# Patient Record
Sex: FEMALE
[De-identification: names, ages, dates, MRNs, and addresses within clinical notes are randomized per-mention and may not be internally consistent; named-entity substitution may affect disease eponyms.]

---

## 2024-08-14 ENCOUNTER — RX ONLY (RX ONLY)
Age: 69
End: 2024-08-14

## 2024-08-14 ENCOUNTER — DOCTOR'S OFFICE (OUTPATIENT)
Facility: LOCATION | Age: 69
Setting detail: OPHTHALMOLOGY
End: 2024-08-14
Payer: MEDICARE

## 2024-08-14 DIAGNOSIS — H40.1132: ICD-10-CM

## 2024-08-14 PROCEDURE — 99213 OFFICE O/P EST LOW 20 MIN: CPT | Performed by: OPHTHALMOLOGY

## 2024-08-28 ENCOUNTER — DOCTOR'S OFFICE (OUTPATIENT)
Facility: LOCATION | Age: 69
Setting detail: OPHTHALMOLOGY
End: 2024-08-28
Payer: MEDICARE

## 2024-08-28 DIAGNOSIS — H40.1132: ICD-10-CM

## 2024-08-28 DIAGNOSIS — H35.353: ICD-10-CM

## 2024-08-28 DIAGNOSIS — Z94.7: ICD-10-CM

## 2024-08-28 PROCEDURE — 92134 CPTRZ OPH DX IMG PST SGM RTA: CPT | Performed by: OPHTHALMOLOGY

## 2024-08-28 PROCEDURE — 99213 OFFICE O/P EST LOW 20 MIN: CPT | Performed by: OPHTHALMOLOGY

## 2024-09-11 ENCOUNTER — DOCTOR'S OFFICE (OUTPATIENT)
Dept: URBAN - NONMETROPOLITAN AREA CLINIC 1 | Facility: CLINIC | Age: 69
Setting detail: OPHTHALMOLOGY
End: 2024-09-11
Payer: MEDICARE

## 2024-09-11 DIAGNOSIS — H40.1132: ICD-10-CM

## 2024-09-11 DIAGNOSIS — H35.353: ICD-10-CM

## 2024-09-11 PROCEDURE — 92134 CPTRZ OPH DX IMG PST SGM RTA: CPT | Performed by: OPHTHALMOLOGY

## 2024-09-11 PROCEDURE — 99213 OFFICE O/P EST LOW 20 MIN: CPT | Performed by: OPHTHALMOLOGY

## 2024-09-11 ASSESSMENT — VISUAL ACUITY
OS_BCVA: 20/50-
OD_BCVA: 20/400

## 2024-09-11 ASSESSMENT — TONOMETRY
OD_IOP_MMHG: 19
OS_IOP_MMHG: 19

## 2024-09-11 ASSESSMENT — CORNEAL EDEMA - FOLDS/STRIAE: OS_FOLDSSTRIAE: 2+

## 2024-09-25 ENCOUNTER — DOCTOR'S OFFICE (OUTPATIENT)
Facility: LOCATION | Age: 69
Setting detail: OPHTHALMOLOGY
End: 2024-09-25
Payer: MEDICARE

## 2024-09-25 ENCOUNTER — RX ONLY (RX ONLY)
Age: 69
End: 2024-09-25

## 2024-09-25 DIAGNOSIS — H35.353: ICD-10-CM

## 2024-09-25 PROBLEM — Z96.1 PRESENCE OF INTRAOCULAR LENS ; BOTH EYES: Status: ACTIVE | Noted: 2024-09-25

## 2024-09-25 PROCEDURE — 99213 OFFICE O/P EST LOW 20 MIN: CPT | Performed by: OPHTHALMOLOGY

## 2024-09-25 ASSESSMENT — CORNEAL EDEMA - FOLDS/STRIAE: OS_FOLDSSTRIAE: 2+

## 2024-09-25 ASSESSMENT — VISUAL ACUITY
OS_BCVA: 20/70-2
OD_BCVA: 20/100

## 2024-10-09 ENCOUNTER — RX ONLY (RX ONLY)
Age: 69
End: 2024-10-09

## 2024-10-09 ENCOUNTER — DOCTOR'S OFFICE (OUTPATIENT)
Facility: LOCATION | Age: 69
Setting detail: OPHTHALMOLOGY
End: 2024-10-09
Payer: MEDICARE

## 2024-10-09 DIAGNOSIS — H35.353: ICD-10-CM

## 2024-10-09 PROCEDURE — 99213 OFFICE O/P EST LOW 20 MIN: CPT | Performed by: OPHTHALMOLOGY

## 2024-10-09 ASSESSMENT — CONFRONTATIONAL VISUAL FIELD TEST (CVF)
OS_FINDINGS: FULL
OD_FINDINGS: FULL

## 2024-10-09 ASSESSMENT — VISUAL ACUITY
OD_BCVA: 20/70-
OS_BCVA: 20/40-

## 2024-10-23 ENCOUNTER — DOCTOR'S OFFICE (OUTPATIENT)
Facility: LOCATION | Age: 69
Setting detail: OPHTHALMOLOGY
End: 2024-10-23
Payer: MEDICARE

## 2024-10-23 DIAGNOSIS — H35.353: ICD-10-CM

## 2024-10-23 DIAGNOSIS — H40.1132: ICD-10-CM

## 2024-10-23 PROCEDURE — 92134 CPTRZ OPH DX IMG PST SGM RTA: CPT | Performed by: OPHTHALMOLOGY

## 2024-10-23 PROCEDURE — 92014 COMPRE OPH EXAM EST PT 1/>: CPT | Performed by: OPHTHALMOLOGY

## 2024-10-23 ASSESSMENT — VISUAL ACUITY
OD_BCVA: 20/70-
OS_BCVA: 20/40-

## 2024-11-06 ENCOUNTER — DOCTOR'S OFFICE (OUTPATIENT)
Facility: LOCATION | Age: 69
Setting detail: OPHTHALMOLOGY
End: 2024-11-06
Payer: MEDICARE

## 2024-11-06 ENCOUNTER — RX ONLY (RX ONLY)
Age: 69
End: 2024-11-06

## 2024-11-06 DIAGNOSIS — H40.041: ICD-10-CM

## 2024-11-06 DIAGNOSIS — H40.1132: ICD-10-CM

## 2024-11-06 PROCEDURE — 99213 OFFICE O/P EST LOW 20 MIN: CPT | Performed by: OPHTHALMOLOGY

## 2024-11-06 ASSESSMENT — VISUAL ACUITY
OD_BCVA: CF 1FT
OS_BCVA: 20/40

## 2024-11-06 ASSESSMENT — CONFRONTATIONAL VISUAL FIELD TEST (CVF)
OD_FINDINGS: FULL
OS_FINDINGS: FULL

## 2024-11-20 ENCOUNTER — DOCTOR'S OFFICE (OUTPATIENT)
Facility: LOCATION | Age: 69
Setting detail: OPHTHALMOLOGY
End: 2024-11-20
Payer: MEDICARE

## 2024-11-20 DIAGNOSIS — H40.1132: ICD-10-CM

## 2024-11-20 DIAGNOSIS — H18.12: ICD-10-CM

## 2024-11-20 DIAGNOSIS — H40.041: ICD-10-CM

## 2024-11-20 DIAGNOSIS — H35.353: ICD-10-CM

## 2024-11-20 PROCEDURE — 99213 OFFICE O/P EST LOW 20 MIN: CPT | Performed by: OPHTHALMOLOGY

## 2024-11-20 ASSESSMENT — VISUAL ACUITY
OD_BCVA: HM
OS_BCVA: 20/40

## 2024-11-28 ENCOUNTER — APPOINTMENT (EMERGENCY)
Dept: CT IMAGING | Facility: HOSPITAL | Age: 69
DRG: 291 | End: 2024-11-28
Payer: MEDICARE

## 2024-11-28 ENCOUNTER — HOSPITAL ENCOUNTER (INPATIENT)
Facility: HOSPITAL | Age: 69
LOS: 4 days | Discharge: HOME | DRG: 291 | End: 2024-12-03
Attending: EMERGENCY MEDICINE | Admitting: INTERNAL MEDICINE
Payer: MEDICARE

## 2024-11-28 ENCOUNTER — APPOINTMENT (EMERGENCY)
Dept: RADIOLOGY | Facility: HOSPITAL | Age: 69
DRG: 291 | End: 2024-11-28
Payer: MEDICARE

## 2024-11-28 DIAGNOSIS — J90 PLEURAL EFFUSION, RIGHT: ICD-10-CM

## 2024-11-28 DIAGNOSIS — R06.03 RESPIRATORY DISTRESS: ICD-10-CM

## 2024-11-28 DIAGNOSIS — I50.33 ACUTE ON CHRONIC DIASTOLIC HEART FAILURE (HCC): ICD-10-CM

## 2024-11-28 DIAGNOSIS — I50.9 CHF, ACUTE (HCC): Primary | ICD-10-CM

## 2024-11-28 DIAGNOSIS — J44.1 COPD WITH ACUTE EXACERBATION (HCC): ICD-10-CM

## 2024-11-28 LAB
ALBUMIN SERPL BCG-MCNC: 4.1 G/DL (ref 3.5–5)
ALP SERPL-CCNC: 97 U/L (ref 34–104)
ALT SERPL W P-5'-P-CCNC: 48 U/L (ref 7–52)
ANION GAP SERPL CALCULATED.3IONS-SCNC: 10 MMOL/L (ref 4–13)
APTT PPP: 33 SECONDS (ref 23–34)
AST SERPL W P-5'-P-CCNC: 59 U/L (ref 13–39)
BASE EXCESS BLDA CALC-SCNC: -2 MMOL/L (ref -2–3)
BASOPHILS # BLD AUTO: 0.05 THOUSANDS/ΜL (ref 0–0.1)
BASOPHILS NFR BLD AUTO: 0 % (ref 0–1)
BILIRUB SERPL-MCNC: 0.73 MG/DL (ref 0.2–1)
BNP SERPL-MCNC: 2251 PG/ML (ref 0–100)
BUN SERPL-MCNC: 17 MG/DL (ref 5–25)
CA-I BLD-SCNC: 1.14 MMOL/L (ref 1.12–1.32)
CALCIUM SERPL-MCNC: 9.3 MG/DL (ref 8.4–10.2)
CARDIAC TROPONIN I PNL SERPL HS: 57 NG/L (ref ?–50)
CHLORIDE SERPL-SCNC: 109 MMOL/L (ref 96–108)
CO2 SERPL-SCNC: 24 MMOL/L (ref 21–32)
CREAT SERPL-MCNC: 1.07 MG/DL (ref 0.6–1.3)
D DIMER PPP FEU-MCNC: 0.99 UG/ML FEU
EOSINOPHIL # BLD AUTO: 0.09 THOUSAND/ΜL (ref 0–0.61)
EOSINOPHIL NFR BLD AUTO: 1 % (ref 0–6)
ERYTHROCYTE [DISTWIDTH] IN BLOOD BY AUTOMATED COUNT: 16.2 % (ref 11.6–15.1)
GFR SERPL CREATININE-BSD FRML MDRD: 53 ML/MIN/1.73SQ M
GLUCOSE SERPL-MCNC: 101 MG/DL (ref 65–140)
GLUCOSE SERPL-MCNC: 99 MG/DL (ref 65–140)
HCO3 BLDA-SCNC: 23.3 MMOL/L (ref 24–30)
HCT VFR BLD AUTO: 39.8 % (ref 34.8–46.1)
HCT VFR BLD CALC: 40 % (ref 34.8–46.1)
HGB BLD-MCNC: 11.9 G/DL (ref 11.5–15.4)
HGB BLDA-MCNC: 13.6 G/DL (ref 11.5–15.4)
IMM GRANULOCYTES # BLD AUTO: 0.06 THOUSAND/UL (ref 0–0.2)
IMM GRANULOCYTES NFR BLD AUTO: 1 % (ref 0–2)
INR PPP: 1.32 (ref 0.85–1.19)
LYMPHOCYTES # BLD AUTO: 1.24 THOUSANDS/ΜL (ref 0.6–4.47)
LYMPHOCYTES NFR BLD AUTO: 11 % (ref 14–44)
MAGNESIUM SERPL-MCNC: 2 MG/DL (ref 1.9–2.7)
MCH RBC QN AUTO: 25.6 PG (ref 26.8–34.3)
MCHC RBC AUTO-ENTMCNC: 29.9 G/DL (ref 31.4–37.4)
MCV RBC AUTO: 86 FL (ref 82–98)
MONOCYTES # BLD AUTO: 1.04 THOUSAND/ΜL (ref 0.17–1.22)
MONOCYTES NFR BLD AUTO: 9 % (ref 4–12)
NEUTROPHILS # BLD AUTO: 8.68 THOUSANDS/ΜL (ref 1.85–7.62)
NEUTS SEG NFR BLD AUTO: 78 % (ref 43–75)
NRBC BLD AUTO-RTO: 0 /100 WBCS
PCO2 BLD: 24 MMOL/L (ref 21–32)
PCO2 BLD: 40.8 MM HG (ref 42–50)
PH BLD: 7.36 [PH] (ref 7.3–7.4)
PLATELET # BLD AUTO: 233 THOUSANDS/UL (ref 149–390)
PMV BLD AUTO: 12.7 FL (ref 8.9–12.7)
PO2 BLD: 36 MM HG (ref 35–45)
POTASSIUM BLD-SCNC: 3.3 MMOL/L (ref 3.5–5.3)
POTASSIUM SERPL-SCNC: 3.7 MMOL/L (ref 3.5–5.3)
PROT SERPL-MCNC: 6.9 G/DL (ref 6.4–8.4)
PROTHROMBIN TIME: 16.9 SECONDS (ref 12.3–15)
RBC # BLD AUTO: 4.65 MILLION/UL (ref 3.81–5.12)
SAO2 % BLD FROM PO2: 66 % (ref 60–85)
SODIUM BLD-SCNC: 144 MMOL/L (ref 136–145)
SODIUM SERPL-SCNC: 143 MMOL/L (ref 135–147)
SPECIMEN SOURCE: ABNORMAL
WBC # BLD AUTO: 11.16 THOUSAND/UL (ref 4.31–10.16)

## 2024-11-28 PROCEDURE — 93005 ELECTROCARDIOGRAM TRACING: CPT

## 2024-11-28 PROCEDURE — 94760 N-INVAS EAR/PLS OXIMETRY 1: CPT

## 2024-11-28 PROCEDURE — 85730 THROMBOPLASTIN TIME PARTIAL: CPT | Performed by: EMERGENCY MEDICINE

## 2024-11-28 PROCEDURE — 83880 ASSAY OF NATRIURETIC PEPTIDE: CPT | Performed by: EMERGENCY MEDICINE

## 2024-11-28 PROCEDURE — 94644 CONT INHLJ TX 1ST HOUR: CPT

## 2024-11-28 PROCEDURE — 96374 THER/PROPH/DIAG INJ IV PUSH: CPT

## 2024-11-28 PROCEDURE — 84295 ASSAY OF SERUM SODIUM: CPT

## 2024-11-28 PROCEDURE — 99291 CRITICAL CARE FIRST HOUR: CPT | Performed by: EMERGENCY MEDICINE

## 2024-11-28 PROCEDURE — 84484 ASSAY OF TROPONIN QUANT: CPT | Performed by: EMERGENCY MEDICINE

## 2024-11-28 PROCEDURE — 84145 PROCALCITONIN (PCT): CPT | Performed by: EMERGENCY MEDICINE

## 2024-11-28 PROCEDURE — 85379 FIBRIN DEGRADATION QUANT: CPT | Performed by: EMERGENCY MEDICINE

## 2024-11-28 PROCEDURE — 71045 X-RAY EXAM CHEST 1 VIEW: CPT

## 2024-11-28 PROCEDURE — 85014 HEMATOCRIT: CPT

## 2024-11-28 PROCEDURE — 83735 ASSAY OF MAGNESIUM: CPT | Performed by: EMERGENCY MEDICINE

## 2024-11-28 PROCEDURE — 85610 PROTHROMBIN TIME: CPT | Performed by: EMERGENCY MEDICINE

## 2024-11-28 PROCEDURE — 99285 EMERGENCY DEPT VISIT HI MDM: CPT

## 2024-11-28 PROCEDURE — 82947 ASSAY GLUCOSE BLOOD QUANT: CPT

## 2024-11-28 PROCEDURE — 85025 COMPLETE CBC W/AUTO DIFF WBC: CPT | Performed by: EMERGENCY MEDICINE

## 2024-11-28 PROCEDURE — 80053 COMPREHEN METABOLIC PANEL: CPT | Performed by: EMERGENCY MEDICINE

## 2024-11-28 PROCEDURE — 36415 COLL VENOUS BLD VENIPUNCTURE: CPT | Performed by: EMERGENCY MEDICINE

## 2024-11-28 PROCEDURE — 82330 ASSAY OF CALCIUM: CPT

## 2024-11-28 PROCEDURE — 94640 AIRWAY INHALATION TREATMENT: CPT

## 2024-11-28 PROCEDURE — 82803 BLOOD GASES ANY COMBINATION: CPT

## 2024-11-28 PROCEDURE — 84132 ASSAY OF SERUM POTASSIUM: CPT

## 2024-11-28 RX ORDER — SODIUM CHLORIDE FOR INHALATION 0.9 %
3 VIAL, NEBULIZER (ML) INHALATION ONCE
Status: COMPLETED | OUTPATIENT
Start: 2024-11-28 | End: 2024-11-28

## 2024-11-28 RX ORDER — IPRATROPIUM BROMIDE AND ALBUTEROL SULFATE 2.5; .5 MG/3ML; MG/3ML
3 SOLUTION RESPIRATORY (INHALATION) ONCE
Status: COMPLETED | OUTPATIENT
Start: 2024-11-28 | End: 2024-11-28

## 2024-11-28 RX ORDER — FUROSEMIDE 10 MG/ML
20 INJECTION INTRAMUSCULAR; INTRAVENOUS ONCE
Status: COMPLETED | OUTPATIENT
Start: 2024-11-28 | End: 2024-11-28

## 2024-11-28 RX ORDER — ASPIRIN 81 MG/1
162 TABLET, CHEWABLE ORAL ONCE
Status: COMPLETED | OUTPATIENT
Start: 2024-11-28 | End: 2024-11-28

## 2024-11-28 RX ORDER — NITROGLYCERIN 0.4 MG/1
0.4 TABLET SUBLINGUAL
Status: COMPLETED | OUTPATIENT
Start: 2024-11-28 | End: 2024-11-28

## 2024-11-28 RX ORDER — ALBUTEROL SULFATE 5 MG/ML
10 SOLUTION RESPIRATORY (INHALATION) ONCE
Status: COMPLETED | OUTPATIENT
Start: 2024-11-28 | End: 2024-11-28

## 2024-11-28 RX ADMIN — IPRATROPIUM BROMIDE AND ALBUTEROL SULFATE 3 ML: 2.5; .5 SOLUTION RESPIRATORY (INHALATION) at 21:59

## 2024-11-28 RX ADMIN — ASPIRIN 81 MG CHEWABLE TABLET 162 MG: 81 TABLET CHEWABLE at 23:00

## 2024-11-28 RX ADMIN — ISODIUM CHLORIDE 3 ML: 0.03 SOLUTION RESPIRATORY (INHALATION) at 22:11

## 2024-11-28 RX ADMIN — NITROGLYCERIN 0.4 MG: 0.4 TABLET SUBLINGUAL at 23:49

## 2024-11-28 RX ADMIN — ALBUTEROL SULFATE 10 MG: 2.5 SOLUTION RESPIRATORY (INHALATION) at 22:10

## 2024-11-28 RX ADMIN — IPRATROPIUM BROMIDE 0.5 MG: 0.5 SOLUTION RESPIRATORY (INHALATION) at 22:11

## 2024-11-28 RX ADMIN — FUROSEMIDE 20 MG: 10 INJECTION, SOLUTION INTRAMUSCULAR; INTRAVENOUS at 23:50

## 2024-11-28 RX ADMIN — NITROGLYCERIN 0.4 MG: 0.4 TABLET SUBLINGUAL at 21:59

## 2024-11-28 RX ADMIN — NITROGLYCERIN 0.4 MG: 0.4 TABLET SUBLINGUAL at 23:01

## 2024-11-28 NOTE — Clinical Note
Case was discussed with RACHEL Dominguez and the patient's admission status was agreed to be Admission Status: inpatient status to the service of Dr. Jaime

## 2024-11-29 ENCOUNTER — APPOINTMENT (INPATIENT)
Dept: NON INVASIVE DIAGNOSTICS | Facility: HOSPITAL | Age: 69
DRG: 291 | End: 2024-11-29
Payer: MEDICARE

## 2024-11-29 ENCOUNTER — APPOINTMENT (EMERGENCY)
Dept: CT IMAGING | Facility: HOSPITAL | Age: 69
DRG: 291 | End: 2024-11-29
Payer: MEDICARE

## 2024-11-29 PROBLEM — Z72.0 TOBACCO ABUSE: Status: ACTIVE | Noted: 2024-11-29

## 2024-11-29 PROBLEM — I50.32 CHRONIC DIASTOLIC HEART FAILURE (HCC): Status: ACTIVE | Noted: 2024-11-29

## 2024-11-29 PROBLEM — I26.99 PULMONARY EMBOLISM (HCC): Status: ACTIVE | Noted: 2024-11-29

## 2024-11-29 PROBLEM — I50.33 ACUTE ON CHRONIC DIASTOLIC HEART FAILURE (HCC): Status: ACTIVE | Noted: 2024-11-29

## 2024-11-29 PROBLEM — I16.1 HYPERTENSIVE EMERGENCY: Status: RESOLVED | Noted: 2024-11-29 | Resolved: 2024-11-29

## 2024-11-29 PROBLEM — Z86.69 H/O THORACIC OUTLET SYNDROME: Status: ACTIVE | Noted: 2024-11-29

## 2024-11-29 PROBLEM — R79.89 ELEVATED TROPONIN: Status: ACTIVE | Noted: 2024-11-29

## 2024-11-29 PROBLEM — G47.33 OSA (OBSTRUCTIVE SLEEP APNEA): Status: ACTIVE | Noted: 2024-11-29

## 2024-11-29 PROBLEM — J43.9 COPD WITH EMPHYSEMA (HCC): Status: ACTIVE | Noted: 2024-11-29

## 2024-11-29 PROBLEM — I25.10 CORONARY ARTERY DISEASE: Status: ACTIVE | Noted: 2024-11-29

## 2024-11-29 PROBLEM — I48.0 PAROXYSMAL ATRIAL FIBRILLATION (HCC): Status: ACTIVE | Noted: 2024-11-29

## 2024-11-29 PROBLEM — I10 BENIGN ESSENTIAL HTN: Status: ACTIVE | Noted: 2024-11-29

## 2024-11-29 PROBLEM — I16.1 HYPERTENSIVE EMERGENCY: Status: ACTIVE | Noted: 2024-11-29

## 2024-11-29 PROBLEM — D72.829 LEUKOCYTOSIS: Status: ACTIVE | Noted: 2024-11-29

## 2024-11-29 LAB
2HR DELTA HS TROPONIN: 107 NG/L
4HR DELTA HS TROPONIN: 94 NG/L
ANION GAP SERPL CALCULATED.3IONS-SCNC: 9 MMOL/L (ref 4–13)
AORTIC ROOT: 3.2 CM
AORTIC VALVE MEAN VELOCITY: 7.2 M/S
APICAL FOUR CHAMBER EJECTION FRACTION: 70 %
ASCENDING AORTA: 2.3 CM
ATRIAL RATE: 68 BPM
AV LVOT MEAN GRADIENT: 1 MMHG
AV LVOT PEAK GRADIENT: 3 MMHG
AV MEAN GRADIENT: 2 MMHG
AV PEAK GRADIENT: 6 MMHG
AV VELOCITY RATIO: 0.71
BSA FOR ECHO PROCEDURE: 1.58 M2
BUN SERPL-MCNC: 14 MG/DL (ref 5–25)
CALCIUM SERPL-MCNC: 9.1 MG/DL (ref 8.4–10.2)
CARDIAC TROPONIN I PNL SERPL HS: 151 NG/L (ref ?–50)
CARDIAC TROPONIN I PNL SERPL HS: 164 NG/L (ref ?–50)
CHLORIDE SERPL-SCNC: 105 MMOL/L (ref 96–108)
CO2 SERPL-SCNC: 28 MMOL/L (ref 21–32)
CREAT SERPL-MCNC: 1.09 MG/DL (ref 0.6–1.3)
DOP CALC AO PEAK VEL: 1.17 M/S
DOP CALC AO VTI: 26.46 CM
DOP CALC LVOT PEAK VEL VTI: 19.57 CM
DOP CALC LVOT PEAK VEL: 0.83 M/S
E WAVE DECELERATION TIME: 260 MS
E/A RATIO: 1.51
ERYTHROCYTE [DISTWIDTH] IN BLOOD BY AUTOMATED COUNT: 16.4 % (ref 11.6–15.1)
EST. AVERAGE GLUCOSE BLD GHB EST-MCNC: 131 MG/DL
FRACTIONAL SHORTENING: 30 (ref 28–44)
GFR SERPL CREATININE-BSD FRML MDRD: 51 ML/MIN/1.73SQ M
GLUCOSE SERPL-MCNC: 142 MG/DL (ref 65–140)
HBA1C MFR BLD: 6.2 %
HCT VFR BLD AUTO: 39.1 % (ref 34.8–46.1)
HGB BLD-MCNC: 11.8 G/DL (ref 11.5–15.4)
INTERVENTRICULAR SEPTUM IN DIASTOLE (PARASTERNAL SHORT AXIS VIEW): 1.4 CM
INTERVENTRICULAR SEPTUM: 1.4 CM (ref 0.6–1.1)
LAAS-AP2: 33.2 CM2
LAAS-AP4: 36.2 CM2
LEFT ATRIUM SIZE: 3.5 CM
LEFT ATRIUM VOLUME (MOD BIPLANE): 138 ML
LEFT ATRIUM VOLUME INDEX (MOD BIPLANE): 87.3 ML/M2
LEFT INTERNAL DIMENSION IN SYSTOLE: 2.6 CM (ref 2.1–4)
LEFT VENTRICLE DIASTOLIC VOLUME (MOD BIPLANE): 81 ML
LEFT VENTRICLE DIASTOLIC VOLUME INDEX (MOD BIPLANE): 51.3 ML/M2
LEFT VENTRICLE SYSTOLIC VOLUME (MOD BIPLANE): 29 ML
LEFT VENTRICLE SYSTOLIC VOLUME INDEX (MOD BIPLANE): 18.4 ML/M2
LEFT VENTRICULAR INTERNAL DIMENSION IN DIASTOLE: 3.7 CM (ref 3.5–6)
LEFT VENTRICULAR POSTERIOR WALL IN END DIASTOLE: 1.4 CM
LEFT VENTRICULAR STROKE VOLUME: 35 ML
LV EF: 64 %
LVSV (TEICH): 35 ML
MAGNESIUM SERPL-MCNC: 1.8 MG/DL (ref 1.9–2.7)
MCH RBC QN AUTO: 25.5 PG (ref 26.8–34.3)
MCHC RBC AUTO-ENTMCNC: 30.2 G/DL (ref 31.4–37.4)
MCV RBC AUTO: 85 FL (ref 82–98)
MV E'TISSUE VEL-LAT: 6 CM/S
MV E'TISSUE VEL-SEP: 4 CM/S
MV PEAK A VEL: 0.39 M/S
MV PEAK E VEL: 59 CM/S
MV STENOSIS PRESSURE HALF TIME: 75 MS
MV VALVE AREA P 1/2 METHOD: 2.9
P AXIS: 90 DEGREES
PHOSPHATE SERPL-MCNC: 3.2 MG/DL (ref 2.3–4.1)
PLATELET # BLD AUTO: 229 THOUSANDS/UL (ref 149–390)
PMV BLD AUTO: 12.7 FL (ref 8.9–12.7)
POTASSIUM SERPL-SCNC: 3 MMOL/L (ref 3.5–5.3)
PR INTERVAL: 124 MS
PROCALCITONIN SERPL-MCNC: 0.06 NG/ML
QRS AXIS: 61 DEGREES
QRSD INTERVAL: 116 MS
QT INTERVAL: 434 MS
QTC INTERVAL: 461 MS
RBC # BLD AUTO: 4.62 MILLION/UL (ref 3.81–5.12)
RIGHT ATRIUM AREA SYSTOLE A4C: 19.1 CM2
RIGHT VENTRICLE ID DIMENSION: 3.8 CM
SL CV LEFT ATRIUM LENGTH A2C: 7.1 CM
SL CV LV EF: 55
SL CV PED ECHO LEFT VENTRICLE DIASTOLIC VOLUME (MOD BIPLANE) 2D: 60 ML
SL CV PED ECHO LEFT VENTRICLE SYSTOLIC VOLUME (MOD BIPLANE) 2D: 25 ML
SODIUM SERPL-SCNC: 142 MMOL/L (ref 135–147)
T WAVE AXIS: 42 DEGREES
TRICUSPID ANNULAR PLANE SYSTOLIC EXCURSION: 2.1 CM
VENTRICULAR RATE: 68 BPM
WBC # BLD AUTO: 12.64 THOUSAND/UL (ref 4.31–10.16)

## 2024-11-29 PROCEDURE — 71275 CT ANGIOGRAPHY CHEST: CPT

## 2024-11-29 PROCEDURE — 93306 TTE W/DOPPLER COMPLETE: CPT | Performed by: INTERNAL MEDICINE

## 2024-11-29 PROCEDURE — RECHECK: Performed by: FAMILY MEDICINE

## 2024-11-29 PROCEDURE — 36415 COLL VENOUS BLD VENIPUNCTURE: CPT | Performed by: EMERGENCY MEDICINE

## 2024-11-29 PROCEDURE — 93306 TTE W/DOPPLER COMPLETE: CPT

## 2024-11-29 PROCEDURE — 99223 1ST HOSP IP/OBS HIGH 75: CPT | Performed by: INTERNAL MEDICINE

## 2024-11-29 PROCEDURE — 85027 COMPLETE CBC AUTOMATED: CPT | Performed by: PHYSICIAN ASSISTANT

## 2024-11-29 PROCEDURE — 96374 THER/PROPH/DIAG INJ IV PUSH: CPT

## 2024-11-29 PROCEDURE — 80048 BASIC METABOLIC PNL TOTAL CA: CPT | Performed by: PHYSICIAN ASSISTANT

## 2024-11-29 PROCEDURE — 93010 ELECTROCARDIOGRAM REPORT: CPT | Performed by: INTERNAL MEDICINE

## 2024-11-29 PROCEDURE — 83036 HEMOGLOBIN GLYCOSYLATED A1C: CPT | Performed by: PHYSICIAN ASSISTANT

## 2024-11-29 PROCEDURE — 96375 TX/PRO/DX INJ NEW DRUG ADDON: CPT

## 2024-11-29 PROCEDURE — 84484 ASSAY OF TROPONIN QUANT: CPT | Performed by: EMERGENCY MEDICINE

## 2024-11-29 PROCEDURE — 99223 1ST HOSP IP/OBS HIGH 75: CPT | Performed by: PHYSICIAN ASSISTANT

## 2024-11-29 PROCEDURE — 84100 ASSAY OF PHOSPHORUS: CPT | Performed by: PHYSICIAN ASSISTANT

## 2024-11-29 PROCEDURE — 83735 ASSAY OF MAGNESIUM: CPT | Performed by: PHYSICIAN ASSISTANT

## 2024-11-29 RX ORDER — UMECLIDINIUM 62.5 UG/1
1 AEROSOL, POWDER ORAL DAILY
COMMUNITY

## 2024-11-29 RX ORDER — POTASSIUM CHLORIDE 750 MG/1
10 CAPSULE, EXTENDED RELEASE ORAL DAILY
COMMUNITY

## 2024-11-29 RX ORDER — NEBIVOLOL 5 MG/1
5 TABLET ORAL DAILY
Status: DISCONTINUED | OUTPATIENT
Start: 2024-11-29 | End: 2024-12-03 | Stop reason: HOSPADM

## 2024-11-29 RX ORDER — TIMOLOL MALEATE 5 MG/ML
1 SOLUTION/ DROPS OPHTHALMIC 2 TIMES DAILY
Status: DISCONTINUED | OUTPATIENT
Start: 2024-11-29 | End: 2024-12-03 | Stop reason: HOSPADM

## 2024-11-29 RX ORDER — ASPIRIN 81 MG/1
81 TABLET, CHEWABLE ORAL DAILY
Status: DISCONTINUED | OUTPATIENT
Start: 2024-11-29 | End: 2024-12-03 | Stop reason: HOSPADM

## 2024-11-29 RX ORDER — PANTOPRAZOLE SODIUM 40 MG/1
40 TABLET, DELAYED RELEASE ORAL
Status: DISCONTINUED | OUTPATIENT
Start: 2024-11-29 | End: 2024-12-03 | Stop reason: HOSPADM

## 2024-11-29 RX ORDER — ONDANSETRON 2 MG/ML
4 INJECTION INTRAMUSCULAR; INTRAVENOUS EVERY 6 HOURS PRN
Status: DISCONTINUED | OUTPATIENT
Start: 2024-11-29 | End: 2024-12-03 | Stop reason: HOSPADM

## 2024-11-29 RX ORDER — SPIRONOLACTONE 25 MG/1
25 TABLET ORAL DAILY
Status: DISCONTINUED | OUTPATIENT
Start: 2024-11-29 | End: 2024-12-03 | Stop reason: HOSPADM

## 2024-11-29 RX ORDER — POTASSIUM CHLORIDE 1500 MG/1
40 TABLET, EXTENDED RELEASE ORAL ONCE
Status: COMPLETED | OUTPATIENT
Start: 2024-11-29 | End: 2024-11-29

## 2024-11-29 RX ORDER — OXYCODONE AND ACETAMINOPHEN 5; 325 MG/1; MG/1
1 TABLET ORAL EVERY 4 HOURS PRN
COMMUNITY

## 2024-11-29 RX ORDER — KETOROLAC TROMETHAMINE 5 MG/ML
1 SOLUTION OPHTHALMIC 4 TIMES DAILY
Status: DISCONTINUED | OUTPATIENT
Start: 2024-11-29 | End: 2024-12-03 | Stop reason: HOSPADM

## 2024-11-29 RX ORDER — GABAPENTIN 300 MG/1
600 CAPSULE ORAL DAILY
Status: DISCONTINUED | OUTPATIENT
Start: 2024-11-29 | End: 2024-12-03 | Stop reason: HOSPADM

## 2024-11-29 RX ORDER — LORATADINE 10 MG/1
10 TABLET ORAL DAILY
Status: DISCONTINUED | OUTPATIENT
Start: 2024-11-29 | End: 2024-12-03 | Stop reason: HOSPADM

## 2024-11-29 RX ORDER — LISINOPRIL 20 MG/1
20 TABLET ORAL 2 TIMES DAILY
Status: DISCONTINUED | OUTPATIENT
Start: 2024-11-29 | End: 2024-12-01

## 2024-11-29 RX ORDER — OMEPRAZOLE 40 MG/1
40 CAPSULE, DELAYED RELEASE ORAL DAILY
COMMUNITY

## 2024-11-29 RX ORDER — SENNOSIDES 8.6 MG
1 TABLET ORAL
Status: DISCONTINUED | OUTPATIENT
Start: 2024-11-29 | End: 2024-12-03 | Stop reason: HOSPADM

## 2024-11-29 RX ORDER — BRINZOLAMIDE 10 MG/ML
1 SUSPENSION/ DROPS OPHTHALMIC 3 TIMES DAILY
Status: ON HOLD | COMMUNITY
End: 2024-11-29 | Stop reason: CLARIF

## 2024-11-29 RX ORDER — ACETAMINOPHEN 325 MG/1
650 TABLET ORAL EVERY 4 HOURS PRN
Status: DISCONTINUED | OUTPATIENT
Start: 2024-11-29 | End: 2024-12-03 | Stop reason: HOSPADM

## 2024-11-29 RX ORDER — HYDRALAZINE HYDROCHLORIDE 20 MG/ML
10 INJECTION INTRAMUSCULAR; INTRAVENOUS ONCE
Status: COMPLETED | OUTPATIENT
Start: 2024-11-29 | End: 2024-11-29

## 2024-11-29 RX ORDER — ATORVASTATIN CALCIUM 10 MG/1
10 TABLET, FILM COATED ORAL DAILY
Status: DISCONTINUED | OUTPATIENT
Start: 2024-11-29 | End: 2024-12-03 | Stop reason: HOSPADM

## 2024-11-29 RX ORDER — POTASSIUM CHLORIDE 1500 MG/1
20 TABLET, EXTENDED RELEASE ORAL ONCE
Status: COMPLETED | OUTPATIENT
Start: 2024-11-29 | End: 2024-11-29

## 2024-11-29 RX ORDER — LISINOPRIL 20 MG/1
20 TABLET ORAL 2 TIMES DAILY
Status: ON HOLD | COMMUNITY
End: 2024-12-03

## 2024-11-29 RX ORDER — HYDRALAZINE HYDROCHLORIDE 25 MG/1
25 TABLET, FILM COATED ORAL EVERY 8 HOURS SCHEDULED
Status: DISCONTINUED | OUTPATIENT
Start: 2024-11-29 | End: 2024-12-02

## 2024-11-29 RX ORDER — ASPIRIN 81 MG/1
81 TABLET, CHEWABLE ORAL DAILY
COMMUNITY

## 2024-11-29 RX ORDER — KETOROLAC TROMETHAMINE 5 MG/ML
1 SOLUTION OPHTHALMIC 4 TIMES DAILY
COMMUNITY

## 2024-11-29 RX ORDER — GABAPENTIN 600 MG/1
600 TABLET ORAL DAILY
COMMUNITY

## 2024-11-29 RX ORDER — LISINOPRIL 20 MG/1
20 TABLET ORAL ONCE
Status: COMPLETED | OUTPATIENT
Start: 2024-11-29 | End: 2024-11-29

## 2024-11-29 RX ORDER — MAGNESIUM HYDROXIDE/ALUMINUM HYDROXICE/SIMETHICONE 120; 1200; 1200 MG/30ML; MG/30ML; MG/30ML
30 SUSPENSION ORAL EVERY 6 HOURS PRN
Status: DISCONTINUED | OUTPATIENT
Start: 2024-11-29 | End: 2024-12-03 | Stop reason: HOSPADM

## 2024-11-29 RX ORDER — FUROSEMIDE 20 MG/1
20 TABLET ORAL DAILY
COMMUNITY
End: 2024-12-03

## 2024-11-29 RX ORDER — HYDRALAZINE HYDROCHLORIDE 25 MG/1
25 TABLET, FILM COATED ORAL DAILY
COMMUNITY

## 2024-11-29 RX ORDER — ATORVASTATIN CALCIUM 10 MG/1
10 TABLET, FILM COATED ORAL DAILY
COMMUNITY

## 2024-11-29 RX ORDER — OXYCODONE AND ACETAMINOPHEN 5; 325 MG/1; MG/1
1 TABLET ORAL EVERY 6 HOURS PRN
Refills: 0 | Status: DISCONTINUED | OUTPATIENT
Start: 2024-11-29 | End: 2024-12-03 | Stop reason: HOSPADM

## 2024-11-29 RX ORDER — DAPAGLIFLOZIN 10 MG/1
1 TABLET, FILM COATED ORAL DAILY
COMMUNITY

## 2024-11-29 RX ORDER — FUROSEMIDE 10 MG/ML
40 INJECTION INTRAMUSCULAR; INTRAVENOUS
Status: DISCONTINUED | OUTPATIENT
Start: 2024-11-29 | End: 2024-11-30

## 2024-11-29 RX ORDER — HYDRALAZINE HYDROCHLORIDE 25 MG/1
25 TABLET, FILM COATED ORAL DAILY
Status: DISCONTINUED | OUTPATIENT
Start: 2024-11-29 | End: 2024-11-29

## 2024-11-29 RX ORDER — LORATADINE 10 MG/1
10 TABLET ORAL DAILY
COMMUNITY

## 2024-11-29 RX ORDER — SPIRONOLACTONE 25 MG/1
25 TABLET ORAL DAILY
COMMUNITY

## 2024-11-29 RX ORDER — NICOTINE POLACRILEX 2 MG
GUM BUCCAL
COMMUNITY

## 2024-11-29 RX ORDER — NEBIVOLOL 5 MG/1
5 TABLET ORAL DAILY
COMMUNITY

## 2024-11-29 RX ORDER — HYDRALAZINE HYDROCHLORIDE 20 MG/ML
10 INJECTION INTRAMUSCULAR; INTRAVENOUS EVERY 6 HOURS PRN
Status: DISCONTINUED | OUTPATIENT
Start: 2024-11-29 | End: 2024-12-03 | Stop reason: HOSPADM

## 2024-11-29 RX ORDER — MAGNESIUM SULFATE HEPTAHYDRATE 40 MG/ML
2 INJECTION, SOLUTION INTRAVENOUS ONCE
Status: COMPLETED | OUTPATIENT
Start: 2024-11-29 | End: 2024-11-29

## 2024-11-29 RX ORDER — POTASSIUM CHLORIDE 1500 MG/1
40 TABLET, EXTENDED RELEASE ORAL 2 TIMES DAILY
Status: DISCONTINUED | OUTPATIENT
Start: 2024-11-30 | End: 2024-11-30

## 2024-11-29 RX ADMIN — APIXABAN 5 MG: 5 TABLET, FILM COATED ORAL at 18:54

## 2024-11-29 RX ADMIN — KETOROLAC TROMETHAMINE 1 DROP: 5 SOLUTION OPHTHALMIC at 18:21

## 2024-11-29 RX ADMIN — ASPIRIN 81 MG CHEWABLE TABLET 81 MG: 81 TABLET CHEWABLE at 09:41

## 2024-11-29 RX ADMIN — POTASSIUM CHLORIDE 20 MEQ: 1500 TABLET, EXTENDED RELEASE ORAL at 13:32

## 2024-11-29 RX ADMIN — FUROSEMIDE 40 MG: 10 INJECTION, SOLUTION INTRAVENOUS at 10:24

## 2024-11-29 RX ADMIN — HYDRALAZINE HYDROCHLORIDE 25 MG: 25 TABLET ORAL at 05:51

## 2024-11-29 RX ADMIN — GABAPENTIN 600 MG: 300 CAPSULE ORAL at 09:42

## 2024-11-29 RX ADMIN — LISINOPRIL 20 MG: 20 TABLET ORAL at 03:37

## 2024-11-29 RX ADMIN — NEBIVOLOL 5 MG: 5 TABLET ORAL at 05:51

## 2024-11-29 RX ADMIN — MORPHINE SULFATE 2 MG: 2 INJECTION, SOLUTION INTRAMUSCULAR; INTRAVENOUS at 02:58

## 2024-11-29 RX ADMIN — APIXABAN 5 MG: 5 TABLET, FILM COATED ORAL at 09:41

## 2024-11-29 RX ADMIN — FUROSEMIDE 40 MG: 10 INJECTION, SOLUTION INTRAVENOUS at 16:26

## 2024-11-29 RX ADMIN — MAGNESIUM SULFATE HEPTAHYDRATE 2 G: 2 INJECTION, SOLUTION INTRAVENOUS at 22:18

## 2024-11-29 RX ADMIN — KETOROLAC TROMETHAMINE 1 DROP: 5 SOLUTION OPHTHALMIC at 13:21

## 2024-11-29 RX ADMIN — TIMOLOL MALEATE 1 DROP: 5 SOLUTION OPHTHALMIC at 18:54

## 2024-11-29 RX ADMIN — OXYCODONE HYDROCHLORIDE AND ACETAMINOPHEN 1 TABLET: 5; 325 TABLET ORAL at 20:11

## 2024-11-29 RX ADMIN — LORATADINE 10 MG: 10 TABLET ORAL at 09:41

## 2024-11-29 RX ADMIN — PANTOPRAZOLE SODIUM 40 MG: 40 TABLET, DELAYED RELEASE ORAL at 05:51

## 2024-11-29 RX ADMIN — POTASSIUM CHLORIDE 40 MEQ: 1500 TABLET, EXTENDED RELEASE ORAL at 16:24

## 2024-11-29 RX ADMIN — NETARSUDIL 1 DROP: 0.2 SOLUTION/ DROPS OPHTHALMIC; TOPICAL at 22:19

## 2024-11-29 RX ADMIN — BRINZOLAMIDE/BRIMONIDINE TARTRATE 1 DROP: 10; 2 SUSPENSION/ DROPS OPHTHALMIC at 10:24

## 2024-11-29 RX ADMIN — KETOROLAC TROMETHAMINE 1 DROP: 5 SOLUTION OPHTHALMIC at 22:20

## 2024-11-29 RX ADMIN — BRINZOLAMIDE/BRIMONIDINE TARTRATE 1 DROP: 10; 2 SUSPENSION/ DROPS OPHTHALMIC at 22:19

## 2024-11-29 RX ADMIN — IOHEXOL 85 ML: 350 INJECTION, SOLUTION INTRAVENOUS at 01:07

## 2024-11-29 RX ADMIN — BRINZOLAMIDE/BRIMONIDINE TARTRATE 1 DROP: 10; 2 SUSPENSION/ DROPS OPHTHALMIC at 16:26

## 2024-11-29 RX ADMIN — HYDRALAZINE HYDROCHLORIDE 25 MG: 25 TABLET ORAL at 22:20

## 2024-11-29 RX ADMIN — HYDRALAZINE HYDROCHLORIDE 10 MG: 20 INJECTION INTRAMUSCULAR; INTRAVENOUS at 00:39

## 2024-11-29 RX ADMIN — ATORVASTATIN CALCIUM 10 MG: 10 TABLET, FILM COATED ORAL at 09:41

## 2024-11-29 RX ADMIN — KETOROLAC TROMETHAMINE 1 DROP: 5 SOLUTION OPHTHALMIC at 09:34

## 2024-11-29 RX ADMIN — TIMOLOL MALEATE 1 DROP: 5 SOLUTION OPHTHALMIC at 12:10

## 2024-11-29 RX ADMIN — UMECLIDINIUM 1 PUFF: 62.5 AEROSOL, POWDER ORAL at 09:43

## 2024-11-29 RX ADMIN — SPIRONOLACTONE 25 MG: 25 TABLET ORAL at 09:41

## 2024-11-29 RX ADMIN — HYDRALAZINE HYDROCHLORIDE 25 MG: 25 TABLET ORAL at 16:25

## 2024-11-29 RX ADMIN — LATANOPROSTENE BUNOD 1 DROP: 0.24 SOLUTION/ DROPS OPHTHALMIC at 22:21

## 2024-11-29 RX ADMIN — EMPAGLIFLOZIN 10 MG: 10 TABLET, FILM COATED ORAL at 09:41

## 2024-11-29 NOTE — PLAN OF CARE

## 2024-11-29 NOTE — ASSESSMENT & PLAN NOTE
History of nonobstructive CAD  Of note, she did experience complete occlusion of right SFA during coronary angiogram requiring right femoral endarterectomy in 2014  Continue home aspirin, statin, lisinopril, and beta-blocker

## 2024-11-29 NOTE — CASE MANAGEMENT
Case Management Assessment & Discharge Planning Note    Patient name Joaquina Jorge  Location /-01 MRN [id number]  : 1955 Date 2024       Current Admission Date: 2024  Current Admission Diagnosis:Acute on chronic diastolic heart failure (HCC)   Patient Active Problem List    Diagnosis Date Noted Date Diagnosed    Benign essential HTN 2024     Acute on chronic diastolic heart failure (HCC) 2024     COPD with emphysema (HCC) 2024     Coronary artery disease 2024     H/O thoracic outlet syndrome 2024     LYNDA (obstructive sleep apnea) 2024     Paroxysmal atrial fibrillation (HCC) 2024     Pulmonary embolism (HCC) 2024     Elevated troponin 2024     Leukocytosis 2024     Hypertensive emergency 2024     Tobacco abuse 2024       LOS (days): 0  Geometric Mean LOS (GMLOS) (days):   Days to GMLOS:     OBJECTIVE:    Risk of Unplanned Readmission Score: 16.73         Current admission status: Inpatient       Preferred Pharmacy:   [id number] [place] [address]  Phone: [phone] Fax: [phone]    Primary Care Provider: Johnson Auguste    Primary Insurance: MEDICARE  Secondary Insurance:     ASSESSMENT:  Active Health Care Proxies       Lisandro Greene Health Care Representative - Son   Primary Phone: [phone] (Mobile)                 Advance Directives  Does patient have a Health Care POA?: No  Was patient offered paperwork?: Yes  Does patient currently have a Health Care decision maker?: Yes, please see Health Care Proxy section  Does patient have Advance Directives?: No  Was patient offered paperwork?: Yes  Primary Contact: nacho Lawrence.         Readmission Root Cause  30 Day Readmission: No    Patient Information  Admitted from:: Home  Mental Status: Alert  During Assessment patient was accompanied by: Nacho  Assessment  information provided by:: Patient, Son  Primary Caregiver: Self  Support Systems: Self, Family members, Son  County of Residence: Other (specify in comment box) ([place])  What city do you live in?: [place].  Home entry access options. Select all that apply.: Stairs  Number of steps to enter home.: 3  Do the steps have railings?: Yes  Type of Current Residence: 2 story home  Upon entering residence, is there a bedroom on the main floor (no further steps)?: No  A bedroom is located on the following floor levels of residence (select all that apply):: 2nd Floor  Upon entering residence, is there a bathroom on the main floor (no further steps)?: No  Indicate which floors of current residence have a bathroom (select all the apply):: 2nd Floor  Number of steps to 2nd floor from main floor: One Flight  Living Arrangements: Lives w/ Family members, Lives w/ Spouse/significant other  Is patient a ?: No    Activities of Daily Living Prior to Admission  Functional Status: Independent  Completes ADLs independently?: Yes  Ambulates independently?: Yes  Does patient use assisted devices?: No  Does patient currently own DME?: No  Does patient have a history of Outpatient Therapy (PT/OT)?: No  Does the patient have a history of Short-Term Rehab?: No  Does patient have a history of HHC?: No  Does patient currently have HHC?: No         Patient Information Continued  Income Source: SSI/SSD  Does patient have prescription coverage?: No  Does patient receive dialysis treatments?: No  Does patient have a history of substance abuse?: No  Does patient have a history of Mental Health Diagnosis?: No         Means of Transportation  Means of Transport to Hospitals in [place]:: Family transport          DISCHARGE DETAILS:    Discharge planning discussed with:: Pt and son Lisandro  [place] of Choice: Yes     CM contacted family/caregiver?: Yes  Were Treatment Team discharge recommendations reviewed with patient/caregiver?: Yes  Did  patient/caregiver verbalize understanding of patient care needs?: Yes  Were patient/caregiver advised of the risks associated with not following Treatment Team discharge recommendations?: Yes    Contacts  Patient Contacts: allison Lawrence  Relationship to Patient:: Family  Contact Method: In Person  Reason/Outcome: Discharge Planning, Continuity of Care    Requested Home Health Care         Is the patient interested in HHC at discharge?: No    DME Referral Provided  Referral made for DME?: No    Other Referral/Resources/Interventions Provided:  Interventions: None Indicated         Treatment Team Recommendation: Home  Discharge Destination Plan:: Home                                         Additional Comments: CM met with pt and son Lisandro at bedside to discern discharge needs. Pt reports she lives with spouse and granddaugher in a 2sh, 3STE, bed and bath upstairs. Pt reports being independent with ADLs and walking PTA. Does not use or own DME. Has a car, but family provides pt with transport. Had a CPAP, but returned it due to the mask irritating her eyes. No hx of OPPT or STR. Hx of HHC. No inpatient psych stays or D&A treatment. Does not have a medical POA. Provided information on how to obtain one. Son will provide transport at discharge.

## 2024-11-29 NOTE — ASSESSMENT & PLAN NOTE
Accelerated hypertension  BP on admission 229/109  BP elevated this morning but improving since admission: 171/60  On lisinopril 20 mg PO BID, hydralazine 25 mg PO daily, bystolic 5 mg PO daily and spirolactone 25 mg PO daily   Will increase hydralazine to 25 mg PO TID.   Monitor BP.   Will likely improve with diuretics and increase of hydralazine

## 2024-11-29 NOTE — ASSESSMENT & PLAN NOTE
Wt Readings from Last 3 Encounters:   11/29/24 56.2 kg (123 lb 14.4 oz)     Presenting with dyspnea for few hours PTA to the ED w/ worsening leg swelling over the last few weeks  History of diastolic heart failure-maintained on Lasix 20 Mg daily, spironolactone 25 Mg daily, and Farxiga 10 Mg daily  CT chest consistent with pulmonary congestion and BNP 2251  Given Lasix 20 Mg IV x 1 in the ED with 1 L urine output so far-will change to Lasix 40 Mg IV twice daily  Continue home spironolactone and Farxiga  Obtain updated echo  Cardiology consulted  I/O and daily weights  Sodium and fluid restriction

## 2024-11-29 NOTE — ASSESSMENT & PLAN NOTE
HS 57/164/151  Suspect elevated troponin secondary to acute on chronic heart failure, as well as hypertensive emergency  Check echo  Cardiology consulted due to concurrent acute heart failure

## 2024-11-29 NOTE — ASSESSMENT & PLAN NOTE
Wt Readings from Last 3 Encounters:   11/29/24 56.2 kg (123 lb 14.4 oz)     Presenting with dyspnea for few hours PTA to the ED w/ worsening leg swelling over the last few weeks  History of diastolic heart failure-maintained on Lasix 20 Mg daily, spironolactone 25 Mg daily, and Farxiga 10 Mg daily  CT chest consistent with pulmonary congestion and BNP 2251  Given Lasix 20 Mg IV x 1 in the ED with 1 L urine output so far-will change to Lasix 40 Mg IV twice daily  Continue home spironolactone and Farxiga  Obtain updated echo; now pending  Cardiology consulted  I/O and daily weights  Sodium and fluid restriction

## 2024-11-29 NOTE — NURSING NOTE
"Spoke with patient regarding CHF education. CHF folder with booklet reviewed as patient has some vision impairment . Discussed CHF and interventions that can be done to help prevent hospitalization. Diet with sodium restrictions and fluid restrictions reviewed.  Patient able to verbalize the importance of these measures, however she states she has some difficulty with restricting fluids and salt as she doesn't currently keep track of how much she takes in, and drinks a lot of Pepsi.  Importance of daily weights for monitoring reviewed. Patient has a scale at home, but does not weigh herself daily. Patient states she will try and weigh herself daily. Reviewed the \"zones\" and their meaning and when to call her physicians. Patient states she follows regularly with a cardiologist and pulmonologist in the [place] area, as that is where she resides.   Son was present for some of the education. He is visiting from out of town, but will review the information with family in which patient lives with. Son looking into magnifying apparatus to assist patient with reading.   No additional questions by patient or son  Spoke with nurse Mireles and reviewed education provided to patient.   "

## 2024-11-29 NOTE — H&P
H&P - Hospitalist   Name: Joaquina Jorge 69 y.o. female I MRN: [id number]  Unit/Bed#: -01 I Date of Admission: 11/28/2024   Date of Service: 11/29/2024 I Hospital Day: 0     Assessment & Plan  Acute on chronic diastolic heart failure (HCC)  Wt Readings from Last 3 Encounters:   11/29/24 56.2 kg (123 lb 14.4 oz)     Presenting with dyspnea for few hours PTA to the ED w/ worsening leg swelling over the last few weeks  History of diastolic heart failure-maintained on Lasix 20 Mg daily, spironolactone 25 Mg daily, and Farxiga 10 Mg daily  CT chest consistent with pulmonary congestion and BNP 2251  Given Lasix 20 Mg IV x 1 in the ED with 1 L urine output so far-will change to Lasix 40 Mg IV twice daily  Continue home spironolactone and Farxiga  Obtain updated echo  Cardiology consulted  I/O and daily weights  Sodium and fluid restriction  Elevated troponin  HS 57/164/151  Suspect elevated troponin secondary to acute on chronic heart failure, as well as hypertensive emergency  Check echo  Cardiology consulted due to concurrent acute heart failure  Hypertensive emergency  BP markedly elevated in the ED at 229/109  Given NTG without significant improvement, BP did improve s/p hydralazine  Home regimen: Hydralazine 25 Mg daily, lisinopril 20 Mg twice daily, Bystolic 5 Mg daily, spironolactone 25 Mg daily, and Lasix 20 Mg daily  Hold home Lasix due to IV diuresis but continue home medications otherwise  Hydralazine as needed  Leukocytosis  WBC 11.1 6K on admit  No signs of acute bacterial infection at this time  Monitor off antibiotics  Trend CBC and fever curve  COPD with emphysema (HCC)  Home regimen: Incruse daily  Patient is without active wheezing on admit  Continue home Incruse for now as she has good inspiratory effort on exam  Low threshold to change to nebulizers if inspiratory effort changes at all  Benign essential HTN  Home regimen: Hydralazine 25 Mg daily, lisinopril 20 Mg twice daily, Bystolic 5 Mg  "daily, spironolactone 25 Mg daily, and Lasix 20 Mg daily  Continue home medications except for Lasix at this time  Further A/P above as hypertensive emergency  Paroxysmal atrial fibrillation (HCC)  RC: Nebivolol 5 Mg daily  AC: Eliquis 5 Mg twice daily  Continue home medications  Pulmonary embolism (HCC)  Continue home Eliquis 5 Mg twice daily  History of IVC filter placement in the past  Coronary artery disease  History of nonobstructive CAD  Of note, she did experience complete occlusion of right SFA during coronary angiogram requiring right femoral endarterectomy in 2014  Continue home aspirin, statin, lisinopril, and beta-blocker  LYNDA (obstructive sleep apnea)  Previously wore CPAP but this was stopped secondary to worsening glaucoma  NC HS PRN  Tobacco abuse  Smokes approximately 5 cigarettes daily  NRT while inpatient      VTE Pharmacologic Prophylaxis: VTE Score: 5 High Risk (Score >/= 5) - Pharmacological DVT Prophylaxis Ordered: apixaban (Eliquis). Sequential Compression Devices Ordered.  Code Status: Level 1 - Full Code   Discussion with family: Updated  (son) at bedside.    Anticipated Length of Stay: Patient will be admitted on an inpatient basis with an anticipated length of stay of greater than 2 midnights secondary to acute on chronic diastolic heart failure.    History of Present Illness   Chief Complaint: \" Feeling hot and short of breath\"    Joaquina Jorge is a 69 y.o. female with a PMH of diastolic HF, PAF, right femoral endarterectomy, tobacco abuse, and HTN who presents with her son for evaluation of dyspnea and feeling hot.  Patient reports a few hours prior to arrival to the ED she was at her son's house and Feeling like she was hot and could not breathe.  She states that she stepped outside without any improvement in her symptoms.  She also used her inhaler without any change in symptoms.  She has noticed increased leg swelling over the last few weeks.  Denies chest pain or " tightness.  Denies abdominal pain, nausea, vomiting, or constipation.  No fevers or chills.  No recent URI-like symptoms.  Denies any dyspnea prior to symptom onset earlier today.    Review of Systems   Constitutional:  Negative for chills and fever.   HENT:  Negative for congestion.    Respiratory:  Positive for shortness of breath.    Cardiovascular:  Positive for leg swelling. Negative for chest pain.   Gastrointestinal:  Negative for abdominal pain, constipation, nausea and vomiting.   Genitourinary:  Negative for difficulty urinating and dysuria.   Neurological:  Negative for weakness and numbness.   All other systems reviewed and are negative.      Historical Information   Past Medical History:   Diagnosis Date    Bronchitis     COPD (chronic obstructive pulmonary disease) ([facility])     Hypertension     MI (myocardial infarction) ([facility])      Past Surgical History:   Procedure Laterality Date    EYE SURGERY       Social History     Tobacco Use    Smoking status: Every Day     Types: Cigarettes    Smokeless tobacco: Never   Vaping Use    Vaping status: Never Used   Substance and Sexual Activity    Alcohol use: Not Currently    Drug use: Never    Sexual activity: Not on file     E-Cigarette/Vaping    E-Cigarette Use Never User      E-Cigarette/Vaping Substances    Nicotine No     THC No     CBD No     Flavoring No     Other No     Unknown No      Family history non-contributory  Social History:  Marital Status: Single   Occupation: Retired  Patient Pre-hospital Living Situation: Home, With spouse  Patient Pre-hospital Level of Mobility: walks  Patient Pre-hospital Diet Restrictions: None    Meds/Allergies   I have reviewed home medications with patient personally.  Prior to Admission medications    Medication Sig Start Date End Date Taking? Authorizing Provider   apixaban (ELIQUIS) 5 mg Take 5 mg by mouth 2 (two) times a day   Yes Historical Provider, MD   aspirin 81 mg chewable tablet Chew 81 mg daily   Yes  Historical Provider, MD   atorvastatin (LIPITOR) 10 mg tablet Take 10 mg by mouth daily   Yes Historical Provider, MD   Brinzolamide-Brimonidine 1-0.2 % SUSP Administer 1 drop to both eyes 3 (three) times a day   Yes Historical Provider, MD   dapagliflozin (Farxiga) 10 MG tablet Take 1 tablet by mouth daily   Yes Historical Provider, MD   furosemide (LASIX) 20 mg tablet Take 20 mg by mouth daily   Yes Historical Provider, MD   gabapentin (NEURONTIN) 600 MG tablet Take 600 mg by mouth daily   Yes Historical Provider, MD   hydrALAZINE (APRESOLINE) 25 mg tablet Take 25 mg by mouth daily   Yes Historical Provider, MD   ketorolac (ACULAR) 0.5 % ophthalmic solution Administer 1 drop to both eyes 4 (four) times a day   Yes Historical Provider, MD   lisinopril (ZESTRIL) 20 mg tablet Take 20 mg by mouth 2 (two) times a day   Yes Historical Provider, MD   loratadine (CLARITIN) 10 mg tablet Take 10 mg by mouth daily   Yes Historical Provider, MD   nebivolol (BYSTOLIC) 5 mg tablet Take 5 mg by mouth daily   Yes Historical Provider, MD   Netarsudil Dimesylate 0.02 % SOLN Apply 1 drop to eye daily   Yes Historical Provider, MD   omeprazole (PriLOSEC) 40 MG capsule Take 40 mg by mouth daily   Yes Historical Provider, MD   oxyCODONE-acetaminophen (PERCOCET) 5-325 mg per tablet Take 1 tablet by mouth every 4 (four) hours as needed for moderate pain   Yes Historical Provider, MD   potassium chloride (MICRO-K) 10 MEQ CR capsule Take 10 mEq by mouth daily   Yes Historical Provider, MD   spironolactone (ALDACTONE) 25 mg tablet Take 25 mg by mouth daily   Yes Historical Provider, MD   timolol (BETIMOL) 0.5 % ophthalmic solution Administer 1 drop to both eyes 2 (two) times a day   Yes Historical Provider, MD   umeclidinium (Incruse Ellipta) 62.5 mcg/actuation AEPB inhaler Inhale 1 puff daily   Yes Historical Provider, MD   brinzolamide (AZOPT) 1 % ophthalmic suspension Administer 1 drop to both eyes 3 (three) times a day  11/29/24 Yes  Historical Provider, MD     Allergies   Allergen Reactions    Penicillins Other (See Comments)     Pt cannot remember.    Voltaren [Diclofenac] Hives       Objective :  Temp:  [97.2 °F (36.2 °C)-99.3 °F (37.4 °C)] 99.3 °F (37.4 °C)  HR:  [63-73] 71  BP: (174-229)/() 192/90  Resp:  [20-24] 20  SpO2:  [90 %-100 %] 100 %  O2 Device: None (Room air)    Physical Exam  Vitals and nursing note reviewed.   Constitutional:       General: She is not in acute distress.     Appearance: Normal appearance. She is not ill-appearing.      Comments: Pleasant and conversational.   HENT:      Head: Normocephalic.      Nose: Nose normal.      Mouth/Throat:      Mouth: Mucous membranes are moist.   Eyes:      Conjunctiva/sclera: Conjunctivae normal.   Cardiovascular:      Rate and Rhythm: Normal rate and regular rhythm.      Pulses: Normal pulses.      Heart sounds: No murmur heard.  Pulmonary:      Effort: Pulmonary effort is normal. No respiratory distress.      Breath sounds: Rales (Fine bibasilar rales.) present. No wheezing (No expiratory wheezing).   Abdominal:      General: Abdomen is flat.      Palpations: Abdomen is soft.      Tenderness: There is no abdominal tenderness.   Musculoskeletal:         General: Normal range of motion.      Cervical back: Normal range of motion.      Comments: 2+ pitting edema to bilateral lower extremities.  Nonhealing wound to left foot, no surrounding erythema or purulent drainage.   Skin:     General: Skin is warm and dry.   Neurological:      General: No focal deficit present.      Mental Status: She is alert and oriented to person, place, and time.   Psychiatric:         Mood and Affect: Mood normal.         Thought Content: Thought content normal.          Lines/Drains:            Lab Results: I have reviewed the following results:  Results from last 7 days   Lab Units 11/28/24  2150   WBC Thousand/uL 11.16*   HEMOGLOBIN g/dL 11.9   HEMATOCRIT % 39.8   PLATELETS Thousands/uL 233   SEGS  "PCT % 78*   LYMPHO PCT % 11*   MONO PCT % 9   EOS PCT % 1     Results from last 7 days   Lab Units 11/28/24  2150   SODIUM mmol/L 143   POTASSIUM mmol/L 3.7   CHLORIDE mmol/L 109*   CO2 mmol/L 24   BUN mg/dL 17   CREATININE mg/dL 1.07   ANION GAP mmol/L 10   CALCIUM mg/dL 9.3   ALBUMIN g/dL 4.1   TOTAL BILIRUBIN mg/dL 0.73   ALK PHOS U/L 97   ALT U/L 48   AST U/L 59*   GLUCOSE RANDOM mg/dL 99     Results from last 7 days   Lab Units 11/28/24  2150   INR  1.32*         No results found for: \"HGBA1C\"  Results from last 7 days   Lab Units 11/28/24  2150   PROCALCITONIN ng/ml 0.06       Imaging Results Review: I reviewed radiology reports from this admission including: CT chest.  I personally reviewed chest x-ray.  My personal interpretation is cardiomegaly, emphysematous lungs, and pulmonary congestion.  Other Study Results Review: No additional pertinent studies reviewed.    Administrative Statements       ** Please Note: This note has been constructed using a voice recognition system. **    "

## 2024-11-29 NOTE — ASSESSMENT & PLAN NOTE
HS troponin 57-->164-->151  Patient reports chest pain but is reproducible on exam and non cardiac  Elevated troponin/non MI elevated troponin in the setting of CHF   Echocardiogram pending

## 2024-11-29 NOTE — PROGRESS NOTES
Progress Note - Hospitalist   Name: Joaquina Jorge 69 y.o. female I MRN: [id number]  Unit/Bed#: -01 I Date of Admission: 11/28/2024   Date of Service: 11/29/2024 I Hospital Day: 0    Assessment & Plan  Acute on chronic diastolic heart failure (HCC)  Wt Readings from Last 3 Encounters:   11/29/24 56.2 kg (123 lb 14.4 oz)     Presenting with dyspnea for few hours PTA to the ED w/ worsening leg swelling over the last few weeks  History of diastolic heart failure-maintained on Lasix 20 Mg daily, spironolactone 25 Mg daily, and Farxiga 10 Mg daily  CT chest consistent with pulmonary congestion and BNP 2251  Given Lasix 20 Mg IV x 1 in the ED with 1 L urine output so far-will change to Lasix 40 Mg IV twice daily  Continue home spironolactone and Farxiga  Obtain updated echo; now pending  Cardiology consulted  I/O and daily weights  Sodium and fluid restriction  Elevated troponin  HS 57/164/151  Suspect elevated troponin secondary to acute on chronic heart failure, as well as hypertensive emergency  Check echo  Cardiology consulted due to concurrent acute heart failure  Hypertensive emergency  BP markedly elevated in the ED at 229/109  Given NTG without significant improvement, BP did improve s/p hydralazine  Home regimen: Hydralazine 25 Mg daily, lisinopril 20 Mg twice daily, Bystolic 5 Mg daily, spironolactone 25 Mg daily, and Lasix 20 Mg daily  Hold home Lasix due to IV diuresis but continue home medications otherwise  Hydralazine as needed  Leukocytosis  WBC 11.1 6K on admit  No signs of acute bacterial infection at this time  Monitor off antibiotics  Trend CBC and fever curve  COPD with emphysema (HCC)  Home regimen: Incruse daily  Patient is without active wheezing on admit  Continue home Incruse for now as she has good inspiratory effort on exam  Low threshold to change to nebulizers if inspiratory effort changes at all  Benign essential HTN  Home regimen: Hydralazine 25 Mg daily, lisinopril 20 Mg twice  daily, Bystolic 5 Mg daily, spironolactone 25 Mg daily, and Lasix 20 Mg daily  Continue home medications except for Lasix at this time  Now BP is improved  Paroxysmal atrial fibrillation (HCC)  RC: Nebivolol 5 Mg daily  AC: Eliquis 5 Mg twice daily  Continue home medications  Pulmonary embolism (HCC)  Continue home Eliquis 5 Mg twice daily  History of IVC filter placement in the past  Coronary artery disease  History of nonobstructive CAD  Of note, she did experience complete occlusion of right SFA during coronary angiogram requiring right femoral endarterectomy in 2014  Continue home aspirin, statin, lisinopril, and beta-blocker  LYNDA (obstructive sleep apnea)  Previously wore CPAP but this was stopped secondary to worsening glaucoma  NC HS PRN  Tobacco abuse  Smokes approximately 5 cigarettes daily  NRT while inpatient    VTE Pharmacologic Prophylaxis: VTE Score: 5 High Risk (Score >/= 5) - Pharmacological DVT Prophylaxis Ordered: apixaban (Eliquis). Sequential Compression Devices Ordered.    Mobility:   Basic Mobility Inpatient Raw Score: 24  JH-HLM Goal: 8: Walk 250 feet or more  JH-HLM Achieved: 8: Walk 250 feet ot more  JH-HLM Goal achieved. Continue to encourage appropriate mobility.    Patient Centered Rounds: I performed bedside rounds with nursing staff today.   Discussions with Specialists or Other Care Team Provider: nursing    Education and Discussions with Family / Patient: Patient declined call to .     Current Length of Stay: 0 day(s)  Current Patient Status: Inpatient   Certification Statement: The patient will continue to require additional inpatient hospital stay due to CHF exacerbation  Discharge Plan: Anticipate discharge in 24-48 hrs to home.    Code Status: Level 1 - Full Code    Ciarra Kunz was seen and examined. She is doing better today with her dyspnea.     Objective :  Temp:  [97.2 °F (36.2 °C)-99.3 °F (37.4 °C)] 99.2 °F (37.3 °C)  HR:  [58-73] 59  BP:  (118-229)/() 153/67  Resp:  [20-24] 20  SpO2:  [90 %-100 %] 97 %  O2 Device: None (Room air)    Body mass index is 21.95 kg/m².     Input and Output Summary (last 24 hours):     Intake/Output Summary (Last 24 hours) at 11/29/2024 1615  Last data filed at 11/29/2024 1501  Gross per 24 hour   Intake 750 ml   Output 4550 ml   Net -3800 ml       Physical Exam  Vitals and nursing note reviewed.   Constitutional:       General: She is not in acute distress.     Appearance: She is well-developed.   HENT:      Head: Normocephalic and atraumatic.   Cardiovascular:      Rate and Rhythm: Normal rate and regular rhythm.      Heart sounds: No murmur heard.  Pulmonary:      Effort: Pulmonary effort is normal. No respiratory distress.      Breath sounds: Decreased breath sounds present.   Abdominal:      Palpations: Abdomen is soft.      Tenderness: There is no abdominal tenderness.   Musculoskeletal:         General: No swelling.      Cervical back: Neck supple.      Left lower leg: Edema (trace pitting edema) present.   Skin:     General: Skin is warm and dry.   Neurological:      Mental Status: She is alert.   Psychiatric:         Mood and Affect: Mood normal.           Lines/Drains:        Telemetry:  Telemetry Orders (From admission, onward)               24 Hour Telemetry Monitoring  Continuous x 24 Hours (Telem)        Question:  Reason for 24 Hour Telemetry  Answer:  Decompensated CHF- and any one of the following: continuous diuretic infusion or total diuretic dose >200 mg daily, associated electrolyte derangement (I.e. K < 3.0), ionotropic drip (continuous infusion), hx of ventricular arrhythmia, or new EF < 35%                     Telemetry Reviewed: Normal Sinus Rhythm  Indication for Continued Telemetry Use: Acute CHF on >200 mg lasix/day or equivalent dose or with new reduced EF.                Lab Results: I have reviewed the following results:   Results from last 7 days   Lab Units 11/29/24  0312  11/28/24  2150   WBC Thousand/uL 12.64* 11.16*   HEMOGLOBIN g/dL 11.8 11.9   HEMATOCRIT % 39.1 39.8   PLATELETS Thousands/uL 229 233   SEGS PCT %  --  78*   LYMPHO PCT %  --  11*   MONO PCT %  --  9   EOS PCT %  --  1     Results from last 7 days   Lab Units 11/29/24  0915 11/28/24 2150   SODIUM mmol/L 142 143   POTASSIUM mmol/L 3.0* 3.7   CHLORIDE mmol/L 105 109*   CO2 mmol/L 28 24   BUN mg/dL 14 17   CREATININE mg/dL 1.09 1.07   ANION GAP mmol/L 9 10   CALCIUM mg/dL 9.1 9.3   ALBUMIN g/dL  --  4.1   TOTAL BILIRUBIN mg/dL  --  0.73   ALK PHOS U/L  --  97   ALT U/L  --  48   AST U/L  --  59*   GLUCOSE RANDOM mg/dL 142* 99     Results from last 7 days   Lab Units 11/28/24 2150   INR  1.32*         Results from last 7 days   Lab Units 11/29/24  0542   HEMOGLOBIN A1C % 6.2*     Results from last 7 days   Lab Units 11/28/24 2150   PROCALCITONIN ng/ml 0.06       Recent Cultures (last 7 days):               Last 24 Hours Medication List:     Current Facility-Administered Medications:     acetaminophen (TYLENOL) tablet 650 mg, Q4H PRN    aluminum-magnesium hydroxide-simethicone (MAALOX) oral suspension 30 mL, Q6H PRN    apixaban (ELIQUIS) tablet 5 mg, BID    aspirin chewable tablet 81 mg, Daily    atorvastatin (LIPITOR) tablet 10 mg, Daily    Brinzolamide-Brimonidine 1-0.2 % SUSP 1 drop, TID    Empagliflozin (JARDIANCE) tablet 10 mg, Daily    furosemide (LASIX) injection 40 mg, BID (diuretic)    gabapentin (NEURONTIN) capsule 600 mg, Daily    hydrALAZINE (APRESOLINE) injection 10 mg, Q6H PRN    hydrALAZINE (APRESOLINE) tablet 25 mg, Q8H HOME    ketorolac (ACULAR) 0.5 % ophthalmic solution 1 drop, 4x Daily    lisinopril (ZESTRIL) tablet 20 mg, BID    loratadine (CLARITIN) tablet 10 mg, Daily    nebivolol (BYSTOLIC) tablet 5 mg, Daily    Netarsudil Dimesylate 0.02 % SOLN 1 drop, HS    nicotine (NICODERM CQ) 7 mg/24hr TD 24 hr patch 1 patch, Daily    ondansetron (ZOFRAN) injection 4 mg, Q6H PRN    oxyCODONE-acetaminophen  (PERCOCET) 5-325 mg per tablet 1 tablet, Q6H PRN    pantoprazole (PROTONIX) EC tablet 40 mg, Early Morning    potassium chloride (Klor-Con M20) CR tablet 40 mEq, Once    senna (SENOKOT) tablet 8.6 mg, HS PRN    spironolactone (ALDACTONE) tablet 25 mg, Daily    timolol (TIMOPTIC) 0.5 % ophthalmic solution 1 drop, BID    umeclidinium 62.5 mcg/actuation inhaler AEPB 1 puff, Daily    Administrative Statements   Today, Patient Was Seen By: Lynn Charles MD      **Please Note: This note may have been constructed using a voice recognition system.**

## 2024-11-29 NOTE — ASSESSMENT & PLAN NOTE
Home regimen: Incruse daily  Patient is without active wheezing on admit  Continue home Incruse for now as she has good inspiratory effort on exam  Low threshold to change to nebulizers if inspiratory effort changes at all

## 2024-11-29 NOTE — ED PROVIDER NOTES
Time reflects when diagnosis was documented in both MDM as applicable and the Disposition within this note       Time User Action Codes Description Comment    11/29/2024  3:11 AM Johnson Calderon [R06.03] Respiratory distress     11/29/2024  3:11 AM Johnson Calderon [J44.1] COPD with acute exacerbation (HCC)     11/29/2024  3:11 AM Johnson Calderon [I50.9] CHF, acute (HCC)     11/29/2024  3:52 AM Johnson Calderon Modify [R06.03] Respiratory distress     11/29/2024  3:52 AM Johnson Calderon Modify [I50.9] CHF, acute (HCC)     11/29/2024  3:52 AM Johnson Calderon [J90] Pleural effusion, right           ED Disposition       ED Disposition   Admit    Condition   Stable    Date/Time   Fri Nov 29, 2024  3:37 AM    Comment   Case was discussed with RACHEL Dominguez and the patient's admission status was agreed to be Admission Status: inpatient status to the service of Dr. Charles .               Assessment & Plan       Medical Decision Making  Diff includes chf exacerbation, copd exacerbation, coronary episode, pneumonia, effusion, pe    Amount and/or Complexity of Data Reviewed  Labs: ordered.  Radiology: ordered and independent interpretation performed.    Risk  OTC drugs.  Prescription drug management.  Decision regarding hospitalization.        ED Course as of 11/29/24 0352   Thu Nov 28, 2024   2258 Still wheezing, chest tight 7/10 after 1 SL nitro.  Getting castellanos neb.  Adding small dose of lasix for elevated bnp and ct ordered   2349 Still sob and chest tight 7/10 - giving lasix and 3rd nitro sl.  BP still high as well.   Fri Nov 29, 2024   0021 TERE Choiey placed US IV but not able to use for contrast - patient currently urinating then will eval for another IV   0030 Diuresed but still chest discomfort and HTN, takes hydralazine at home, will give IV dose here   0136 BP improved, still trouble breathing but sats improved 100% on room air, sounds like rales, still diuresing, still has chest tight 5/10.   0257 Patient has fentanyl patch for  thoracic outlet syndrome on right side   0300 Called to expedite getting ct read       Medications   ipratropium-albuterol (DUO-NEB) 0.5-2.5 mg/3 mL inhalation solution 3 mL (3 mL Nebulization Given 11/28/24 2159)   nitroglycerin (NITROSTAT) SL tablet 0.4 mg (0.4 mg Sublingual Given 11/28/24 2349)   albuterol inhalation solution 10 mg (10 mg Nebulization Given 11/28/24 2210)     And   ipratropium (ATROVENT) 0.02 % inhalation solution 0.5 mg (0.5 mg Nebulization Given 11/28/24 2211)     And   sodium chloride 0.9 % inhalation solution 3 mL (3 mL Nebulization Given 11/28/24 2211)   aspirin chewable tablet 162 mg (162 mg Oral Given 11/28/24 2300)   furosemide (LASIX) injection 20 mg (20 mg Intravenous Given 11/28/24 2350)   hydrALAZINE (APRESOLINE) injection 10 mg (10 mg Intravenous Given 11/29/24 0039)   iohexol (OMNIPAQUE) 350 MG/ML injection (MULTI-DOSE) 100 mL (85 mL Intravenous Given 11/29/24 0107)   morphine injection 2 mg (2 mg Intravenous Given 11/29/24 0258)   lisinopril (ZESTRIL) tablet 20 mg (20 mg Oral Given 11/29/24 0337)       ED Risk Strat Scores                       PERC Rule for PE      Flowsheet Row Most Recent Value   PERC Rule for PE    Age >=50 1 Filed at: 11/28/2024 2254   HR >=100 0 Filed at: 11/28/2024 2254   O2 Sat on room air < 95% --   History of PE or DVT --   Recent trauma or surgery --   Hemoptysis --   Exogenous estrogen --   Unilateral leg swelling --   PERC Rule for PE Results 1 Filed at: 11/28/2024 2254                Wells' Criteria for PE      Flowsheet Row Most Recent Value   Wells' Criteria for PE    Clinical signs and symptoms of DVT 0 Filed at: 11/28/2024 2255   PE is primary diagnosis or equally likely 0 Filed at: 11/28/2024 2255   HR >100 0 Filed at: 11/28/2024 2255   Immobilization at least 3 days or Surgery in the previous 4 weeks 0 Filed at: 11/28/2024 3348   Previous, objectively diagnosed PE or DVT 0 Filed at: 11/28/2024 2482   Hemoptysis 0 Filed at: 11/28/2024 0306    Malignancy with treatment within 6 months or palliative 0 Filed at: 11/28/2024 2255   Wells' Criteria Total 0 Filed at: 11/28/2024 2255                        History of Present Illness       Chief Complaint   Patient presents with    Shortness of Breath     Pt c/o shortness of breath for past 3-4 hours and chest pain that feels like a tightness. Hx of bronchitis,HTN, COPD, MI. Denies any recent fevers, chills, N/V/D. No cough.        Past Medical History:   Diagnosis Date    Bronchitis     COPD (chronic obstructive pulmonary disease) (HCC)     Hypertension     MI (myocardial infarction) (HCC)       Past Surgical History:   Procedure Laterality Date    EYE SURGERY        History reviewed. No pertinent family history.   Social History     Tobacco Use    Smoking status: Every Day     Types: Cigarettes    Smokeless tobacco: Never   Vaping Use    Vaping status: Never Used   Substance Use Topics    Alcohol use: Not Currently    Drug use: Never      E-Cigarette/Vaping    E-Cigarette Use Never User       E-Cigarette/Vaping Substances    Nicotine No     THC No     CBD No     Flavoring No     Other No     Unknown No       I have reviewed and agree with the history as documented.     Hx from patient and son - from [facility],  Hx of chf, copd, c/o few hours now sob chest tight, bilateral leg swelling.  Took inhaler without relief.        Review of Systems   Constitutional:  Negative for chills and fever.   HENT:  Negative for rhinorrhea and sore throat.    Respiratory:  Positive for chest tightness and shortness of breath.    Cardiovascular:  Negative for chest pain.   Gastrointestinal:  Negative for constipation, diarrhea, nausea and vomiting.   Genitourinary:  Negative for dysuria and frequency.   Skin:  Negative for rash.   All other systems reviewed and are negative.          Objective       ED Triage Vitals   Temperature Pulse Blood Pressure Respirations SpO2 Patient Position - Orthostatic VS   11/28/24 2126 11/28/24  2120 11/28/24 2121 11/28/24 2120 11/28/24 2121 11/28/24 2121   (!) 97.2 °F (36.2 °C) 70 (!) 229/109 (!) 24 90 % Sitting      Temp Source Heart Rate Source BP Location FiO2 (%) Pain Score    11/28/24 2126 11/28/24 2120 11/28/24 2121 -- 11/28/24 2121    Temporal Monitor Left arm  10 - Worst Possible Pain      Vitals      Date and Time Temp Pulse SpO2 Resp BP Pain Score FACES Pain Rating User   11/29/24 0345 -- 65 96 % 20 174/74 -- -- [place]   11/29/24 0337 -- -- -- -- 174/74 -- -- [place]   11/29/24 0258 -- -- -- -- -- 5 --    11/29/24 0145 -- 73 93 % 20 182/79 -- --    11/29/24 0030 -- 63 93 % 20 206/96 -- --    11/29/24 0000 -- -- -- -- -- 7 --    11/28/24 2230 -- 68 96 % 22 209/96 -- --    11/28/24 2126 97.2 °F (36.2 °C) -- -- -- -- -- -- [place]   11/28/24 2121 -- -- 90 % -- 229/109 10 - Worst Possible Pain -- [place]   11/28/24 2120 -- 70 -- 24 -- -- -- [place]            Physical Exam  Vitals and nursing note reviewed.   Constitutional:       General: She is in acute distress.      Appearance: She is well-developed.   HENT:      Head: Normocephalic and atraumatic.      Right Ear: External ear normal.      Left Ear: External ear normal.      Nose: Nose normal.   Eyes:      Conjunctiva/sclera: Conjunctivae normal.      Pupils: Pupils are equal, round, and reactive to light.   Cardiovascular:      Rate and Rhythm: Normal rate and regular rhythm.      Heart sounds: Normal heart sounds.   Pulmonary:      Effort: Tachypnea and respiratory distress present.      Breath sounds: Wheezing present.   Abdominal:      General: Bowel sounds are normal. There is no distension.      Palpations: Abdomen is soft.      Tenderness: There is no abdominal tenderness.   Musculoskeletal:         General: No deformity. Normal range of motion.      Cervical back: Normal range of motion and neck supple. No spinous process tenderness.      Right lower leg: Edema present.      Left lower leg: Edema present.   Skin:     General: Skin is warm and dry.       Findings: No rash.   Neurological:      General: No focal deficit present.      Mental Status: She is alert.      GCS: GCS eye subscore is 4. GCS verbal subscore is 5. GCS motor subscore is 6.      Sensory: No sensory deficit.   Psychiatric:         Mood and Affect: Mood normal.         Results Reviewed       Procedure Component Value Units Date/Time    HS Troponin I 4hr [[id number]]  (Abnormal) Collected: 11/29/24 0150    Lab Status: Final result Specimen: Blood from Arm, Left Updated: 11/29/24 0233     hs TnI 4hr 151 ng/L      Delta 4hr hsTnI 94 ng/L     HS Troponin I 2hr [[id number]]  (Abnormal) Collected: 11/28/24 2355    Lab Status: Final result Specimen: Blood from Arm, Right Updated: 11/29/24 0022     hs TnI 2hr 164 ng/L      Delta 2hr hsTnI 107 ng/L     Procalcitonin [[id number]]  (Normal) Collected: 11/28/24 2150    Lab Status: Final result Specimen: Blood from Arm, Left Updated: 11/29/24 0017     Procalcitonin 0.06 ng/ml     Magnesium [[id number]]  (Normal) Collected: 11/28/24 2150    Lab Status: Final result Specimen: Blood from Arm, Left Updated: 11/28/24 2317     Magnesium 2.0 mg/dL     Comprehensive metabolic panel [[id number]]  (Abnormal) Collected: 11/28/24 2150    Lab Status: Final result Specimen: Blood from Arm, Left Updated: 11/28/24 2231     Sodium 143 mmol/L      Potassium 3.7 mmol/L      Chloride 109 mmol/L      CO2 24 mmol/L      ANION GAP 10 mmol/L      BUN 17 mg/dL      Creatinine 1.07 mg/dL      Glucose 99 mg/dL      Calcium 9.3 mg/dL      AST 59 U/L      ALT 48 U/L      Alkaline Phosphatase 97 U/L      Total Protein 6.9 g/dL      Albumin 4.1 g/dL      Total Bilirubin 0.73 mg/dL      eGFR 53 ml/min/1.73sq m     Narrative:      National Kid[employer] Foundation guidelines for Chronic Kidney Disease (CKD):     Stage 1 with normal or high GFR (GFR > 90 mL/min/1.73 square meters)    Stage 2 Mild CKD (GFR = 60-89 mL/min/1.73 square meters)    Stage 3A Moderate CKD (GFR = 45-59 mL/min/1.73 square  meters)    Stage 3B Moderate CKD (GFR = 30-44 mL/min/1.73 square meters)    Stage 4 Severe CKD (GFR = 15-29 mL/min/1.73 square meters)    Stage 5 End Stage CKD (GFR <15 mL/min/1.73 square meters)  Note: GFR calculation is accurate only with a steady state creatinine    B-Type Natriuretic Peptide(BNP) [[id number]]  (Abnormal) Collected: 11/28/24 2150    Lab Status: Final result Specimen: Blood from Arm, Left Updated: 11/28/24 2229     BNP 2,251 pg/mL     APTT [[id number]]  (Normal) Collected: 11/28/24 2150    Lab Status: Final result Specimen: Blood from Arm, Left Updated: 11/28/24 2225     PTT 33 seconds     D-dimer, quantitative [[id number]]  (Abnormal) Collected: 11/28/24 2150    Lab Status: Final result Specimen: Blood from Arm, Left Updated: 11/28/24 2225     D-Dimer, Quant 0.99 ug/ml FEU     Narrative:      In the evaluation for possible pulmonary embolism, in the appropriate (Well's Score of 4 or less) patient, the age adjusted d-dimer cutoff for this patient can be calculated as:    Age x 0.01 (in ug/mL) for Age-adjusted D-dimer exclusion threshold for a patient over 50 years.    Protime-INR [[id number]]  (Abnormal) Collected: 11/28/24 2150    Lab Status: Final result Specimen: Blood from Arm, Left Updated: 11/28/24 2225     Protime 16.9 seconds      INR 1.32    Narrative:      INR Therapeutic Range    Indication                                             INR Range      Atrial Fibrillation                                               2.0-3.0  Hypercoagulable State                                    2.0.2.3  Left Ventricular Asist Device                            2.0-3.0  Mechanical Heart Valve                                  -    Aortic(with afib, MI, embolism, HF, LA enlargement,    and/or coagulopathy)                                     2.0-3.0 (2.5-3.5)     Mitral                                                             2.5-3.5  Prosthetic/Bioprosthetic Heart Valve               2.0-3.0  Venous  thromboembolism (VTE: VT, PE        2.0-3.0    HS Troponin 0hr (reflex protocol) [[id number]]  (Abnormal) Collected: 11/28/24 2150    Lab Status: Final result Specimen: Blood from Arm, Left Updated: 11/28/24 2222     hs TnI 0hr 57 ng/L     POCT Blood Gas (CG8+) [[id number]]  (Abnormal) Collected: 11/28/24 2147    Lab Status: Final result Specimen: Venous Updated: 11/28/24 2205     ph, [place] ISTAT 7.364     pCO2, [place] i-STAT 40.8 mm HG      pO2, [place] i-STAT 36.0 mm HG      BE, i-STAT -2 mmol/L      HCO3, [place] i-STAT 23.3 mmol/L      CO2, i-STAT 24 mmol/L      O2 Sat, i-STAT 66 %      SODIUM, I-STAT 144 mmol/l      Potassium, i-STAT 3.3 mmol/L      Calcium, Ionized i-STAT 1.14 mmol/L      Hct, i-STAT 40 %      Hgb, i-STAT 13.6 g/dl      Glucose, i-STAT 101 mg/dl      Specimen Type VENOUS    CBC and differential [[id number]]  (Abnormal) Collected: 11/28/24 2150    Lab Status: Final result Specimen: Blood from Arm, Left Updated: 11/28/24 2201     WBC 11.16 Thousand/uL      RBC 4.65 Million/uL      Hemoglobin 11.9 g/dL      Hematocrit 39.8 %      MCV 86 fL      MCH 25.6 pg      MCHC 29.9 g/dL      RDW 16.2 %      MPV 12.7 fL      Platelets 233 Thousands/uL      nRBC 0 /100 WBCs      Segmented % 78 %      Immature Grans % 1 %      Lymphocytes % 11 %      Monocytes % 9 %      Eosinophils Relative 1 %      Basophils Relative 0 %      Absolute Neutrophils 8.68 Thousands/µL      Absolute Immature Grans 0.06 Thousand/uL      Absolute Lymphocytes 1.24 Thousands/µL      Absolute Monocytes 1.04 Thousand/µL      Eosinophils Absolute 0.09 Thousand/µL      Basophils Absolute 0.05 Thousands/µL             CTA chest pe study   Final Interpretation by Frank Brown MD (11/29 0316)      No evidence of pulmonary embolus.      CHF. Small right pleural effusion                  Workstation performed: [id number]         X-ray chest 1 view portable   ED Interpretation by Johnson Calderon DO (11/28 2202)   Hyperinflation, no major congestion or focal  opacity, interpreted by me          ECG 12 Lead Documentation Only    Date/Time: 11/28/2024 10:16 PM    Performed by: Johnson Calderon DO  Authorized by: Johnson Calderon DO    Indications / Diagnosis:  Sob chest tight  ECG reviewed by me, the ED Provider: yes    Patient location:  ED  Previous ECG:     Previous ECG:  Unavailable  Interpretation:     Interpretation: non-specific    Rate:     ECG rate:  68    ECG rate assessment: normal    Rhythm:     Rhythm: sinus rhythm    Ectopy:     Ectopy: none    QRS:     QRS axis:  Normal    QRS intervals:  Normal  Conduction:     Conduction: abnormal      Abnormal conduction: complete RBBB    ST segments:     ST segments:  Normal  T waves:     T waves: peaked      Peaked:  V4 and V5  Comments:      This EKG was interpreted by me.  CriticalCare Time    Date/Time: 11/28/2024 10:51 PM    Performed by: Johnson Calderon DO  Authorized by: Johnson Calderon DO    Critical care provider statement:     Critical care time (minutes):  55    Critical care time was exclusive of:  Separately billable procedures and treating other patients and teaching time    Critical care was necessary to treat or prevent imminent or life-threatening deterioration of the following conditions:  Cardiac failure and respiratory failure    Critical care was time spent personally by me on the following activities:  Obtaining history from patient or surrogate, development of treatment plan with patient or surrogate, discussions with consultants, evaluation of patient's response to treatment, examination of patient, review of old charts, re-evaluation of patient's condition, ordering and review of radiographic studies, ordering and review of laboratory studies and ordering and performing treatments and interventions      ED Medication and Procedure Management   Prior to Admission Medications   Prescriptions Last Dose Informant Patient Reported? Taking?   Netarsudil Dimesylate 0.02 % SOLN 11/28/2024  Yes Yes   Sig: Apply 1  drop to eye daily   apixaban (ELIQUIS) 5 mg 11/29/2024 Morning  Yes Yes   Sig: Take 5 mg by mouth 2 (two) times a day   aspirin 81 mg chewable tablet 11/29/2024 Morning  Yes Yes   Sig: Chew 81 mg daily   atorvastatin (LIPITOR) 10 mg tablet 11/29/2024 Morning  Yes Yes   Sig: Take 10 mg by mouth daily   brinzolamide (AZOPT) 1 % ophthalmic suspension 11/28/2024  Yes Yes   Sig: Administer 1 drop to both eyes 3 (three) times a day   dapagliflozin (Farxiga) 10 MG tablet 11/29/2024 Morning  Yes Yes   Sig: Take 1 tablet by mouth daily   gabapentin (NEURONTIN) 600 MG tablet 11/29/2024 Morning  Yes Yes   Sig: Take 600 mg by mouth daily   hydrALAZINE (APRESOLINE) 25 mg tablet 11/29/2024 Morning  Yes Yes   Sig: Take 25 mg by mouth daily   ketorolac (ACULAR) 0.5 % ophthalmic solution 11/28/2024  Yes Yes   Sig: Administer 1 drop to both eyes 4 (four) times a day   lisinopril (ZESTRIL) 20 mg tablet 11/29/2024 Morning  Yes Yes   Sig: Take 20 mg by mouth 2 (two) times a day   loratadine (CLARITIN) 10 mg tablet   Yes Yes   Sig: Take 10 mg by mouth daily   nebivolol (BYSTOLIC) 5 mg tablet 11/29/2024 Morning  Yes Yes   Sig: Take 5 mg by mouth daily   omeprazole (PriLOSEC) 40 MG capsule 11/29/2024 Morning  Yes Yes   Sig: Take 40 mg by mouth daily   oxyCODONE-acetaminophen (PERCOCET) 5-325 mg per tablet 11/29/2024 Morning  Yes Yes   Sig: Take 1 tablet by mouth every 4 (four) hours as needed for moderate pain   potassium chloride (KLOR-CON) 8 MEQ tablet 11/29/2024 Morning  Yes Yes   Sig: Take 8 mEq by mouth daily   spironolactone (ALDACTONE) 25 mg tablet 11/29/2024 Morning  Yes Yes   Sig: Take 25 mg by mouth daily   timolol (BETIMOL) 0.5 % ophthalmic solution 11/28/2024  Yes Yes   Sig: Administer 1 drop to both eyes 2 (two) times a day      Facility-Administered Medications: None     Patient's Medications   Discharge Prescriptions    No medications on file     No discharge procedures on file.  ED SEPSIS DOCUMENTATION   Time reflects when  diagnosis was documented in both MDM as applicable and the Disposition within this note       Time User Action Codes Description Comment    11/29/2024  3:11 AM Johnson Calderon [R06.03] Respiratory distress     11/29/2024  3:11 AM Johnson Calderon [J44.1] COPD with acute exacerbation (HCC)     11/29/2024  3:11 AM Johnson Calderon [I50.9] CHF, acute ([facility])     11/29/2024  3:52 AM Johnson Claderon [R06.03] Respiratory distress     11/29/2024  3:52 AM Johnson Calderon [I50.9] CHF, acute (HCC)     11/29/2024  3:52 AM Johnson Calderon [J90] Pleural effusion, right                    Johnson Calderon DO  11/29/24 0352

## 2024-11-29 NOTE — ASSESSMENT & PLAN NOTE
BP markedly elevated in the ED at 229/109  Given NTG without significant improvement, BP did improve s/p hydralazine  Home regimen: Hydralazine 25 Mg daily, lisinopril 20 Mg twice daily, Bystolic 5 Mg daily, spironolactone 25 Mg daily, and Lasix 20 Mg daily  Hold home Lasix due to IV diuresis but continue home medications otherwise  Hydralazine as needed

## 2024-11-29 NOTE — ASSESSMENT & PLAN NOTE
Wt Readings from Last 3 Encounters:   11/29/24 56.2 kg (123 lb 14.4 oz)   BNP on admission: 2,251  CT of chest showed no PE. CHF and small R pleural effusion   Chest xray showed mild to moderate CHF   On furosemide 40 mg IV BID   She takes furosemide 20 mg PO PRN at home. Sometimes she takes daily and other days she takes BID  Monitor I/Os -1.6 L in 24 hours   Daily weights 123 lbs today   Echocardiogram pending   Continue 2 gram Na diet and 1800 ml fluid restriction

## 2024-11-29 NOTE — ASSESSMENT & PLAN NOTE
Home regimen: Hydralazine 25 Mg daily, lisinopril 20 Mg twice daily, Bystolic 5 Mg daily, spironolactone 25 Mg daily, and Lasix 20 Mg daily  Continue home medications except for Lasix at this time  Further A/P above as hypertensive emergency

## 2024-11-29 NOTE — CONSULTS
Consultation - Cardiology Team One  Joaquina Jorge 69 y.o. female MRN: [id number]  Unit/Bed#: -01 Encounter: [id number]    Inpatient consult to Cardiology  Consult performed by: MACI Arias  Consult ordered by: Kathia Dominguez PA-C          Physician Requesting Consult: Lynn Charles MD  Reason for Consult / Principal Problem: CHF       Assessment & Plan  Acute on chronic diastolic heart failure (HCC)  Wt Readings from Last 3 Encounters:   11/29/24 56.2 kg (123 lb 14.4 oz)   BNP on admission: 2,251  CT of chest showed no PE. CHF and small R pleural effusion   Chest xray showed mild to moderate CHF   On furosemide 40 mg IV BID   She takes furosemide 20 mg PO PRN at home. Sometimes she takes daily and other days she takes BID  Monitor I/Os -1.6 L in 24 hours   Daily weights 123 lbs today   Echocardiogram pending   Continue 2 gram Na diet and 1800 ml fluid restriction     Benign essential HTN  Accelerated hypertension  BP on admission 229/109  BP elevated this morning but improving since admission: 171/60  On lisinopril 20 mg PO BID, hydralazine 25 mg PO daily, bystolic 5 mg PO daily and spirolactone 25 mg PO daily   Will increase hydralazine to 25 mg PO TID.   Monitor BP.   Will likely improve with diuretics and increase of hydralazine   Paroxysmal atrial fibrillation (HCC)  On bystolic 5 mg PO daily   On eliquis 5 mg PO BID for OAC   ECG shows NSR   Coronary artery disease  History of non obstructive CAD   On aspirin, statin and BB   Elevated troponin  HS troponin 57-->164-->151  Patient reports chest pain but is reproducible on exam and non cardiac  Elevated troponin/non MI elevated troponin in the setting of CHF   Echocardiogram pending       History of Present Illness   HPI: Joaquina Jorge is a 69 y.o. year old female who has a history of chronic HF, paroxysmal atrial fibrillation, non obstructive CAD, hypertension, COPD, LYNDA and tobacco use. She follows with cardiologist Dr. Michael Gomez from  [facility].            She presents to  [facility] 11/28/2024 with complaint of SOB. Patient reports for the past week she noticed increased swelling in her legs. She takes furosemide 20 mg PO PRN at home. She was seen by a provider on Wednesday for routine follow up and her swelling in her legs was noted. She was advised to take furosemide. She reports she took her medication as instructed. On Wednesday, she went out for Chinese food. Yesterday, she went to her son's house for Thanksgiving and felt SOB. She went outside multiple times to catch her breath until her son took her to the ER. Imaging on arrival showed CHF and she was started on IV diuretics: furosemide 40 mg IV BID. BP was elevated and she did receive  nitroglycerin. She is reporting L sided chest pain that is reproducible on exam. No chest pain with exertion.     Echocardiogram pending.       Telemetry reviewed personally:  Normal sinus rhythm     Review of Systems   Constitutional: Positive for malaise/fatigue. Negative for chills and fever.   HENT:  Negative for congestion.    Cardiovascular:  Positive for dyspnea on exertion and leg swelling. Negative for chest pain and orthopnea.   Respiratory:  Negative for shortness of breath.    Musculoskeletal:  Negative for falls.   Gastrointestinal:  Positive for bloating. Negative for nausea and vomiting.   Neurological:  Negative for dizziness and light-headedness.   Psychiatric/Behavioral:  Negative for altered mental status.    All other systems reviewed and are negative.    Historical Information   Past Medical History:   Diagnosis Date    Bronchitis     COPD (chronic obstructive pulmonary disease) (HCC)     Hypertension     MI (myocardial infarction) ([facility])      Past Surgical History:   Procedure Laterality Date    EYE SURGERY       Social History     Substance and Sexual Activity   Alcohol Use Not Currently     Social History     Substance and Sexual Activity   Drug Use Never     Social  "History     Tobacco Use   Smoking Status Every Day    Types: Cigarettes   Smokeless Tobacco Never     Family History: History reviewed. No pertinent family history.    Meds/Allergies   all current active meds have been reviewed and current meds:   Current Facility-Administered Medications:     acetaminophen (TYLENOL) tablet 650 mg, Q4H PRN    aluminum-magnesium hydroxide-simethicone (MAALOX) oral suspension 30 mL, Q6H PRN    apixaban (ELIQUIS) tablet 5 mg, BID    aspirin chewable tablet 81 mg, Daily    atorvastatin (LIPITOR) tablet 10 mg, Daily    Brinzolamide-Brimonidine 1-0.2 % SUSP 1 drop, TID    Empagliflozin (JARDIANCE) tablet 10 mg, Daily    furosemide (LASIX) injection 40 mg, BID (diuretic)    gabapentin (NEURONTIN) capsule 600 mg, Daily    hydrALAZINE (APRESOLINE) injection 10 mg, Q6H PRN    hydrALAZINE (APRESOLINE) tablet 25 mg, Daily    ketorolac (ACULAR) 0.5 % ophthalmic solution 1 drop, 4x Daily    lisinopril (ZESTRIL) tablet 20 mg, BID    loratadine (CLARITIN) tablet 10 mg, Daily    nebivolol (BYSTOLIC) tablet 5 mg, Daily    Netarsudil Dimesylate 0.02 % SOLN 1 drop, Daily    nicotine (NICODERM CQ) 7 mg/24hr TD 24 hr patch 1 patch, Daily    ondansetron (ZOFRAN) injection 4 mg, Q6H PRN    oxyCODONE-acetaminophen (PERCOCET) 5-325 mg per tablet 1 tablet, Q6H PRN    pantoprazole (PROTONIX) EC tablet 40 mg, Early Morning    senna (SENOKOT) tablet 8.6 mg, HS PRN    spironolactone (ALDACTONE) tablet 25 mg, Daily    timolol (BETIMOL) 0.5 % ophthalmic solution 1 drop, BID    umeclidinium 62.5 mcg/actuation inhaler AEPB 1 puff, Daily       Allergies   Allergen Reactions    Penicillins Other (See Comments)     Pt cannot remember.    Voltaren [Diclofenac] Hives       Objective   Vitals: Blood pressure (!) 171/60, pulse 60, temperature 98.4 °F (36.9 °C), temperature source Axillary, resp. rate 20, height 5' 3\" (1.6 m), weight 56.2 kg (123 lb 14.4 oz), SpO2 97%.,     Body mass index is 21.95 kg/m².,     Systolic " (24hrs), Av , Min:171 , Max:229     Diastolic (24hrs), Av, Min:60, Max:109            Intake/Output Summary (Last 24 hours) at 2024 0837  Last data filed at 2024 0712  Gross per 24 hour   Intake 480 ml   Output 2150 ml   Net -1670 ml     Weight (last 2 days)       Date/Time Weight    24 0523 56.2 (123.9)    24 04:42:08 56.2 (123.8)          Invasive Devices       Peripheral Intravenous Line  Duration             Peripheral IV 24 Distal;Right;Upper;Ventral (anterior) Arm <1 day    Peripheral IV 24 Left;Ventral (anterior) Forearm <1 day                    Physical Exam  HENT:      Head: Normocephalic.      Mouth/Throat:      Mouth: Mucous membranes are moist.   Cardiovascular:      Rate and Rhythm: Normal rate and regular rhythm.      Pulses: Normal pulses.   Pulmonary:      Effort: Pulmonary effort is normal. No respiratory distress.      Comments: RA  Crackles in bases   Abdominal:      General: Bowel sounds are normal.      Palpations: Abdomen is soft.   Musculoskeletal:         General: Swelling present. Normal range of motion.      Cervical back: Neck supple.      Comments: + 1 edema bilateral LE    Skin:     General: Skin is warm and dry.      Capillary Refill: Capillary refill takes less than 2 seconds.   Neurological:      Mental Status: She is alert and oriented to person, place, and time.   Psychiatric:         Mood and Affect: Mood normal.           LABORATORY RESULTS:      CBC with diff:   Results from last 7 days   Lab Units 24  0542 24  2150 24  2147   WBC Thousand/uL 12.64* 11.16*  --    HEMOGLOBIN g/dL 11.8 11.9  --    I STAT HEMOGLOBIN g/dl  --   --  13.6   HEMATOCRIT % 39.1 39.8  --    HEMATOCRIT, ISTAT %  --   --  40   MCV fL 85 86  --    PLATELETS Thousands/uL 229 233  --    RBC Million/uL 4.62 4.65  --    MCH pg 25.5* 25.6*  --    MCHC g/dL 30.2* 29.9*  --    RDW % 16.4* 16.2*  --    MPV fL 12.7 12.7  --    NRBC AUTO /100 WBCs  --  0   "--        CMP:  Results from last 7 days   Lab Units 11/28/24 2150 11/28/24 2147   POTASSIUM mmol/L 3.7  --    CHLORIDE mmol/L 109*  --    CO2 mmol/L 24  --    CO2, I-STAT mmol/L  --  24   BUN mg/dL 17  --    CREATININE mg/dL 1.07  --    GLUCOSE, ISTAT mg/dl  --  101   CALCIUM mg/dL 9.3  --    AST U/L 59*  --    ALT U/L 48  --    ALK PHOS U/L 97  --    EGFR ml/min/1.73sq m 53  --        BMP:  Results from last 7 days   Lab Units 11/28/24 2150 11/28/24 2147   POTASSIUM mmol/L 3.7  --    CHLORIDE mmol/L 109*  --    CO2 mmol/L 24  --    CO2, I-STAT mmol/L  --  24   BUN mg/dL 17  --    CREATININE mg/dL 1.07  --    GLUCOSE, ISTAT mg/dl  --  101   CALCIUM mg/dL 9.3  --         Results from last 7 days   Lab Units 11/28/24 2150   MAGNESIUM mg/dL 2.0       Results from last 7 days   Lab Units 11/28/24 2150   INR  1.32*     Lipid Profile:   No results found for: \"CHOL\"  No results found for: \"HDL\"  No results found for: \"LDLCALC\"  No results found for: \"TRIG\"      Cardiac testing:   No results found for this or any previous visit.    No results found for this or any previous visit.    No valid procedures specified.  No results found for this or any previous visit.    Imaging:   X-ray chest 1 view portable  Result Date: 11/29/2024  Narrative: XR CHEST PORTABLE INDICATION: CHF. COMPARISON: None FINDINGS: There is mild to moderate CHF with prominence of the pulmonary vasculature and interstitial markings. No pneumothorax or pleural effusion. Heart size is not well evaluated on this single portable AP view. Bones are unremarkable for age.     Impression: Mild to moderate CHF. Clinical service was aware of the findings at the time of dictation. Workstation performed: [id number]     CTA chest pe study  Result Date: 11/29/2024  Narrative: CTA - CHEST WITH IV CONTRAST - PULMONARY ANGIOGRAM INDICATION: sob. COMPARISON: None. TECHNIQUE: CTA examination of the chest was performed using angiographic technique according to a " protocol specifically tailored to evaluate for pulmonary embolism. Multiplanar 2D reformatted images were created from the source data. In addition, coronal  3D MIP postprocessing was performed on the acquisition scanner. Radiation dose length product (DLP) for this visit: 257.6 mGy-cm . This examination, like all CT scans performed in the [facility], was performed utilizing techniques to minimize radiation dose exposure, including the use of iterative reconstruction and automated exposure control. IV Contrast: 85 mL of iohexol (OMNIPAQUE) FINDINGS: PULMONARY ARTERIAL TREE:  No pulmonary embolus. LUNGS: Diffuse interlobar septal thickening. There is no tracheal or endobronchial lesion. PLEURA: Small right pleural effusion HEART/GREAT VESSELS: Heart is unremarkable for patient's age. No thoracic aortic aneurysm. MEDIASTINUM AND SOBEIDA: Unremarkable. CHEST WALL AND LOWER NECK: Unremarkable. VISUALIZED STRUCTURES IN THE UPPER ABDOMEN: Unremarkable. OSSEOUS STRUCTURES: No acute fracture or destructive osseous lesion.     Impression: No evidence of pulmonary embolus. CHF. Small right pleural effusion Workstation performed: [id number]     Thank you for allowing us to participate in this patient's care.   Counseling / Coordination of Care  Total floor / unit time spent today 45 minutes.  Greater than 50% of total time was spent with the patient and / or family counseling and / or coordination of care.  A description of the counseling / coordination of care: Review of history, current assessment, development of a plan.      Code Status: Level 1 - Full Code    ** Please Note: Dragon 360 Dictation voice to text software may have been used in the creation of this document. **

## 2024-11-29 NOTE — ASSESSMENT & PLAN NOTE
Home regimen: Hydralazine 25 Mg daily, lisinopril 20 Mg twice daily, Bystolic 5 Mg daily, spironolactone 25 Mg daily, and Lasix 20 Mg daily  Continue home medications except for Lasix at this time  Now BP is improved

## 2024-11-29 NOTE — ASSESSMENT & PLAN NOTE
WBC 11.1 6K on admit  No signs of acute bacterial infection at this time  Monitor off antibiotics  Trend CBC and fever curve

## 2024-11-30 LAB
ANION GAP SERPL CALCULATED.3IONS-SCNC: 8 MMOL/L (ref 4–13)
BASOPHILS # BLD AUTO: 0.03 THOUSANDS/ΜL (ref 0–0.1)
BASOPHILS NFR BLD AUTO: 0 % (ref 0–1)
BUN SERPL-MCNC: 23 MG/DL (ref 5–25)
CALCIUM SERPL-MCNC: 8.2 MG/DL (ref 8.4–10.2)
CHLORIDE SERPL-SCNC: 101 MMOL/L (ref 96–108)
CO2 SERPL-SCNC: 30 MMOL/L (ref 21–32)
CREAT SERPL-MCNC: 1.35 MG/DL (ref 0.6–1.3)
EOSINOPHIL # BLD AUTO: 0.14 THOUSAND/ΜL (ref 0–0.61)
EOSINOPHIL NFR BLD AUTO: 2 % (ref 0–6)
ERYTHROCYTE [DISTWIDTH] IN BLOOD BY AUTOMATED COUNT: 16.3 % (ref 11.6–15.1)
GFR SERPL CREATININE-BSD FRML MDRD: 40 ML/MIN/1.73SQ M
GLUCOSE SERPL-MCNC: 84 MG/DL (ref 65–140)
HCT VFR BLD AUTO: 39.3 % (ref 34.8–46.1)
HGB BLD-MCNC: 12 G/DL (ref 11.5–15.4)
IMM GRANULOCYTES # BLD AUTO: 0.05 THOUSAND/UL (ref 0–0.2)
IMM GRANULOCYTES NFR BLD AUTO: 1 % (ref 0–2)
LYMPHOCYTES # BLD AUTO: 2.25 THOUSANDS/ΜL (ref 0.6–4.47)
LYMPHOCYTES NFR BLD AUTO: 25 % (ref 14–44)
MAGNESIUM SERPL-MCNC: 2.5 MG/DL (ref 1.9–2.7)
MCH RBC QN AUTO: 25.5 PG (ref 26.8–34.3)
MCHC RBC AUTO-ENTMCNC: 30.5 G/DL (ref 31.4–37.4)
MCV RBC AUTO: 84 FL (ref 82–98)
MONOCYTES # BLD AUTO: 1.17 THOUSAND/ΜL (ref 0.17–1.22)
MONOCYTES NFR BLD AUTO: 13 % (ref 4–12)
NEUTROPHILS # BLD AUTO: 5.45 THOUSANDS/ΜL (ref 1.85–7.62)
NEUTS SEG NFR BLD AUTO: 59 % (ref 43–75)
NRBC BLD AUTO-RTO: 0 /100 WBCS
PLATELET # BLD AUTO: 227 THOUSANDS/UL (ref 149–390)
PMV BLD AUTO: 12.5 FL (ref 8.9–12.7)
POTASSIUM SERPL-SCNC: 3.3 MMOL/L (ref 3.5–5.3)
RBC # BLD AUTO: 4.7 MILLION/UL (ref 3.81–5.12)
SODIUM SERPL-SCNC: 139 MMOL/L (ref 135–147)
WBC # BLD AUTO: 9.09 THOUSAND/UL (ref 4.31–10.16)

## 2024-11-30 PROCEDURE — 85025 COMPLETE CBC W/AUTO DIFF WBC: CPT | Performed by: FAMILY MEDICINE

## 2024-11-30 PROCEDURE — 80048 BASIC METABOLIC PNL TOTAL CA: CPT | Performed by: FAMILY MEDICINE

## 2024-11-30 PROCEDURE — 99232 SBSQ HOSP IP/OBS MODERATE 35: CPT

## 2024-11-30 PROCEDURE — 83735 ASSAY OF MAGNESIUM: CPT

## 2024-11-30 RX ORDER — FUROSEMIDE 10 MG/ML
40 INJECTION INTRAMUSCULAR; INTRAVENOUS DAILY
Status: DISCONTINUED | OUTPATIENT
Start: 2024-12-01 | End: 2024-12-02

## 2024-11-30 RX ORDER — POTASSIUM CHLORIDE 14.9 MG/ML
20 INJECTION INTRAVENOUS
Status: COMPLETED | OUTPATIENT
Start: 2024-11-30 | End: 2024-11-30

## 2024-11-30 RX ORDER — POTASSIUM CHLORIDE 29.8 MG/ML
40 INJECTION INTRAVENOUS ONCE
Status: DISCONTINUED | OUTPATIENT
Start: 2024-11-30 | End: 2024-11-30 | Stop reason: SDUPTHER

## 2024-11-30 RX ORDER — POTASSIUM CHLORIDE 1500 MG/1
40 TABLET, EXTENDED RELEASE ORAL 2 TIMES DAILY
Status: DISCONTINUED | OUTPATIENT
Start: 2024-11-30 | End: 2024-12-02

## 2024-11-30 RX ADMIN — LORATADINE 10 MG: 10 TABLET ORAL at 10:09

## 2024-11-30 RX ADMIN — UMECLIDINIUM 1 PUFF: 62.5 AEROSOL, POWDER ORAL at 09:42

## 2024-11-30 RX ADMIN — KETOROLAC TROMETHAMINE 1 DROP: 5 SOLUTION OPHTHALMIC at 12:34

## 2024-11-30 RX ADMIN — BRINZOLAMIDE/BRIMONIDINE TARTRATE 1 DROP: 10; 2 SUSPENSION/ DROPS OPHTHALMIC at 21:08

## 2024-11-30 RX ADMIN — BRINZOLAMIDE/BRIMONIDINE TARTRATE 1 DROP: 10; 2 SUSPENSION/ DROPS OPHTHALMIC at 17:27

## 2024-11-30 RX ADMIN — LATANOPROSTENE BUNOD 1 DROP: 0.24 SOLUTION/ DROPS OPHTHALMIC at 22:31

## 2024-11-30 RX ADMIN — OXYCODONE HYDROCHLORIDE AND ACETAMINOPHEN 1 TABLET: 5; 325 TABLET ORAL at 03:06

## 2024-11-30 RX ADMIN — APIXABAN 5 MG: 5 TABLET, FILM COATED ORAL at 10:09

## 2024-11-30 RX ADMIN — HYDRALAZINE HYDROCHLORIDE 25 MG: 25 TABLET ORAL at 14:37

## 2024-11-30 RX ADMIN — NETARSUDIL 1 DROP: 0.2 SOLUTION/ DROPS OPHTHALMIC; TOPICAL at 23:07

## 2024-11-30 RX ADMIN — APIXABAN 5 MG: 5 TABLET, FILM COATED ORAL at 17:32

## 2024-11-30 RX ADMIN — KETOROLAC TROMETHAMINE 1 DROP: 5 SOLUTION OPHTHALMIC at 08:02

## 2024-11-30 RX ADMIN — POTASSIUM CHLORIDE 20 MEQ: 14.9 INJECTION, SOLUTION INTRAVENOUS at 10:08

## 2024-11-30 RX ADMIN — HYDRALAZINE HYDROCHLORIDE 25 MG: 25 TABLET ORAL at 22:30

## 2024-11-30 RX ADMIN — TIMOLOL MALEATE 1 DROP: 5 SOLUTION OPHTHALMIC at 20:31

## 2024-11-30 RX ADMIN — ASPIRIN 81 MG CHEWABLE TABLET 81 MG: 81 TABLET CHEWABLE at 10:08

## 2024-11-30 RX ADMIN — KETOROLAC TROMETHAMINE 1 DROP: 5 SOLUTION OPHTHALMIC at 18:36

## 2024-11-30 RX ADMIN — HYDRALAZINE HYDROCHLORIDE 25 MG: 25 TABLET ORAL at 05:38

## 2024-11-30 RX ADMIN — PANTOPRAZOLE SODIUM 40 MG: 40 TABLET, DELAYED RELEASE ORAL at 05:38

## 2024-11-30 RX ADMIN — BRINZOLAMIDE/BRIMONIDINE TARTRATE 1 DROP: 10; 2 SUSPENSION/ DROPS OPHTHALMIC at 09:35

## 2024-11-30 RX ADMIN — FUROSEMIDE 40 MG: 10 INJECTION, SOLUTION INTRAVENOUS at 10:10

## 2024-11-30 RX ADMIN — GABAPENTIN 600 MG: 300 CAPSULE ORAL at 10:09

## 2024-11-30 RX ADMIN — TIMOLOL MALEATE 1 DROP: 5 SOLUTION OPHTHALMIC at 10:10

## 2024-11-30 RX ADMIN — ATORVASTATIN CALCIUM 10 MG: 10 TABLET, FILM COATED ORAL at 10:08

## 2024-11-30 RX ADMIN — POTASSIUM CHLORIDE 20 MEQ: 14.9 INJECTION, SOLUTION INTRAVENOUS at 12:32

## 2024-11-30 RX ADMIN — POTASSIUM CHLORIDE 40 MEQ: 1500 TABLET, EXTENDED RELEASE ORAL at 17:32

## 2024-11-30 RX ADMIN — NEBIVOLOL 5 MG: 5 TABLET ORAL at 10:11

## 2024-11-30 RX ADMIN — KETOROLAC TROMETHAMINE 1 DROP: 5 SOLUTION OPHTHALMIC at 21:58

## 2024-11-30 NOTE — ASSESSMENT & PLAN NOTE
Wt Readings from Last 3 Encounters:   11/30/24 54.1 kg (119 lb 4.8 oz)     Presenting with dyspnea for few hours PTA to the ED w/ worsening leg swelling over the last few weeks  History of diastolic heart failure-maintained on Lasix 20 Mg daily, spironolactone 25 Mg daily, and Farxiga 10 Mg daily  CT chest consistent with pulmonary congestion and BNP 2251  Given Lasix 20 Mg IV x 1 in the ED with 1 L urine output so far-will change to Lasix 40 Mg IV twice daily  Obtain updated echo; EF of 50% grade 2 diastolic dysfunction  Documented 4.7 L urine output in past 24 hours; with no lower extremity edema and improvement of respiration;   Creatinine elevated to 1.35 from 1.09      Will change to Lasix daily 40 IV for now  We will hold Aldactone, Jardiance, lisinopril for elevated creatinine; defer to cardiology if would like to restart prior  Cardiology consulted for further recommendations  I/O and daily weights  Sodium and fluid restriction

## 2024-11-30 NOTE — PLAN OF CARE
Problem: Potential for Falls  Goal: Patient will remain free of falls  Description: INTERVENTIONS:  - Educate patient/family on patient safety including physical limitations  - Instruct patient to call for assistance with activity   - Consult OT/PT to assist with strengthening/mobility   - Keep Call bell within reach  - Keep bed low and locked with side rails adjusted as appropriate  - Keep care items and personal belongings within reach  - Initiate and maintain comfort rounds  - Make Fall Risk Sign visible to staff  - Offer Toileting every 2 Hours, in advance of need  - Initiate/Maintain bed/chair alarm  - Obtain necessary fall risk management equipment:   - Apply yellow socks and bracelet for high fall risk patients  - Consider moving patient to room near nurses station  Outcome: Progressing     Problem: PAIN - ADULT  Goal: Verbalizes/displays adequate comfort level or baseline comfort level  Description: Interventions:  - Encourage patient to monitor pain and request assistance  - Assess pain using appropriate pain scale  - Administer analgesics based on type and severity of pain and evaluate response  - Implement non-pharmacological measures as appropriate and evaluate response  - Consider cultural and social influences on pain and pain management  - Notify physician/advanced practitioner if interventions unsuccessful or patient reports new pain  Outcome: Progressing     Problem: INFECTION - ADULT  Goal: Absence or prevention of progression during hospitalization  Description: INTERVENTIONS:  - Assess and monitor for signs and symptoms of infection  - Monitor lab/diagnostic results  - Monitor all insertion sites, i.e. indwelling lines, tubes, and drains  - Monitor endotracheal if appropriate and nasal secretions for changes in amount and color  - [place] appropriate cooling/warming therapies per order  - Administer medications as ordered  - Instruct and encourage patient and family to use good hand hygiene  technique  - Identify and instruct in appropriate isolation precautions for identified infection/condition  Outcome: Progressing  Goal: Absence of fever/infection during neutropenic period  Description: INTERVENTIONS:  - Monitor WBC    Outcome: Progressing     Problem: SAFETY ADULT  Goal: Patient will remain free of falls  Description: INTERVENTIONS:  - Educate patient/family on patient safety including physical limitations  - Instruct patient to call for assistance with activity   - Consult OT/PT to assist with strengthening/mobility   - Keep Call bell within reach  - Keep bed low and locked with side rails adjusted as appropriate  - Keep care items and personal belongings within reach  - Initiate and maintain comfort rounds  - Make Fall Risk Sign visible to staff  - Offer Toileting every 2 Hours, in advance of need  - Initiate/Maintain bed/chair alarm  - Obtain necessary fall risk management equipment:   - Apply yellow socks and bracelet for high fall risk patients  - Consider moving patient to room near nurses station  Outcome: Progressing  Goal: Maintain or return to baseline ADL function  Description: INTERVENTIONS:  -  Assess patient's ability to carry out ADLs; assess patient's baseline for ADL function and identify physical deficits which impact ability to perform ADLs (bathing, care of mouth/teeth, toileting, grooming, dressing, etc.)  - Assess/evaluate cause of self-care deficits   - Assess range of motion  - Assess patient's mobility; develop plan if impaired  - Assess patient's need for assistive devices and provide as appropriate  - Encourage maximum independence but intervene and supervise when necessary  - Involve family in performance of ADLs  - Assess for home care needs following discharge   - Consider OT consult to assist with ADL evaluation and planning for discharge  - Provide patient education as appropriate  Outcome: Progressing  Goal: Maintains/Returns to pre admission functional  level  Description: INTERVENTIONS:  - Perform AM-PAC 6 Click Basic Mobility/ Daily Activity assessment daily.  - Set and communicate daily mobility goal to care team and patient/family/caregiver.   - Collaborate with rehabilitation services on mobility goals if consulted  - Perform Range of Motion 3 times a day.  - Reposition patient every 3 hours.  - Dangle patient 3 times a day  - Stand patient 3 times a day  - Ambulate patient 3 times a day  - Out of bed to chair 3 times a day   - Out of bed for meals 3 times a day  - Out of bed for toileting  - Record patient progress and toleration of activity level   Outcome: Progressing     Problem: DISCHARGE PLANNING  Goal: Discharge to home or other facility with appropriate resources  Description: INTERVENTIONS:  - Identify barriers to discharge w/patient and caregiver  - Arrange for needed discharge resources and transportation as appropriate  - Identify discharge learning needs (meds, wound care, etc.)  - Arrange for interpretive services to assist at discharge as needed  - Refer to Case Management Department for coordinating discharge planning if the patient needs post-hospital services based on physician/advanced practitioner order or complex needs related to functional status, cognitive ability, or social support system  Outcome: Progressing     Problem: Knowledge Deficit  Goal: Patient/family/caregiver demonstrates understanding of disease process, treatment plan, medications, and discharge instructions  Description: Complete learning assessment and assess knowledge base.  Interventions:  - Provide teaching at level of understanding  - Provide teaching via preferred learning methods  Outcome: Progressing     Problem: Nutrition/Hydration-ADULT  Goal: Nutrient/Hydration intake appropriate for improving, restoring or maintaining nutritional needs  Description: Monitor and assess patient's nutrition/hydration status for malnutrition. Collaborate with interdisciplinary  team and initiate plan and interventions as ordered.  Monitor patient's weight and dietary intake as ordered or per policy. Utilize nutrition screening tool and intervene as necessary. Determine patient's food preferences and provide high-protein, high-caloric foods as appropriate.     INTERVENTIONS:  - Monitor oral intake, urinary output, labs, and treatment plans  - Assess nutrition and hydration status and recommend course of action  - Evaluate amount of meals eaten  - Assist patient with eating if necessary   - Allow adequate time for meals  - Recommend/ encourage appropriate diets, oral nutritional supplements, and vitamin/mineral supplements  - Order, calculate, and assess calorie counts as needed  - Recommend, monitor, and adjust tube feedings and TPN/PPN based on assessed needs  - Assess need for intravenous fluids  - Provide specific nutrition/hydration education as appropriate  - Include patient/family/caregiver in decisions related to nutrition  Outcome: Progressing

## 2024-11-30 NOTE — PROGRESS NOTES
Progress Note - Hospitalist   Name: Joaquina Jorge 69 y.o. female I MRN: [id number]  Unit/Bed#: -01 I Date of Admission: 11/28/2024   Date of Service: 11/30/2024 I Hospital Day: 1    Assessment & Plan  Acute on chronic diastolic heart failure (HCC)  Wt Readings from Last 3 Encounters:   11/30/24 54.1 kg (119 lb 4.8 oz)     Presenting with dyspnea for few hours PTA to the ED w/ worsening leg swelling over the last few weeks  History of diastolic heart failure-maintained on Lasix 20 Mg daily, spironolactone 25 Mg daily, and Farxiga 10 Mg daily  CT chest consistent with pulmonary congestion and BNP 2251  Given Lasix 20 Mg IV x 1 in the ED with 1 L urine output so far-will change to Lasix 40 Mg IV twice daily  Obtain updated echo; EF of 50% grade 2 diastolic dysfunction  Documented 4.7 L urine output in past 24 hours; with no lower extremity edema and improvement of respiration;   Creatinine elevated to 1.35 from 1.09      Will change to Lasix daily 40 IV for now  We will hold Aldactone, Jardiance, lisinopril for elevated creatinine; defer to cardiology if would like to restart prior  Cardiology consulted for further recommendations  I/O and daily weights  Sodium and fluid restriction  Elevated troponin  HS 57/164/151  Suspect elevated troponin secondary to acute on chronic heart failure, as well as hypertensive emergency  Check echo  Cardiology consulted due to concurrent acute heart failure  Hypertensive emergency  BP markedly elevated in the ED at 229/109  Given NTG without significant improvement, BP did improve s/p hydralazine  Home regimen: Hydralazine 25 Mg daily, lisinopril 20 Mg twice daily, Bystolic 5 Mg daily, spironolactone 25 Mg daily,  Will hold lisinopril and Aldactone for elevated creatinine and reassess tomorrow  Hydralazine as needed  Leukocytosis  WBC 11.1 6K on admit  No signs of acute bacterial infection at this time  Monitor off antibiotics  Trend CBC and fever curve  COPD with emphysema  (HCC)  Home regimen: Incruse daily  Patient is without active wheezing on admit  Continue home Incruse for now as she has good inspiratory effort on exam  Low threshold to change to nebulizers if inspiratory effort changes at all  Paroxysmal atrial fibrillation (HCC)  RC: Nebivolol 5 Mg daily  AC: Eliquis 5 Mg twice daily  Continue home medications  Pulmonary embolism (HCC)  Continue home Eliquis 5 Mg twice daily  History of IVC filter placement in the past  Coronary artery disease  History of nonobstructive CAD  Of note, she did experience complete occlusion of right SFA during coronary angiogram requiring right femoral endarterectomy in 2014  Continue home aspirin, statin, lisinopril, and beta-blocker  LYNDA (obstructive sleep apnea)  Previously wore CPAP but this was stopped secondary to worsening glaucoma  NC HS PRN  Tobacco abuse  Smokes approximately 5 cigarettes daily  NRT while inpatient    VTE Pharmacologic Prophylaxis: VTE Score: 5 AC eliquis    Mobility:   Basic Mobility Inpatient Raw Score: 24  JH-HLM Goal: 8: Walk 250 feet or more  JH-HLM Achieved: 8: Walk 250 feet ot more  JH-HLM Goal achieved. Continue to encourage appropriate mobility.    Patient Centered Rounds: I performed bedside rounds with nursing staff today.   Discussions with Specialists or Other Care Team Provider: cards    Education and Discussions with Family / Patient: Updated  (son) at bedside.    Current Length of Stay: 1 day(s)  Current Patient Status: Inpatient   Certification Statement: The patient will continue to require additional inpatient hospital stay due to medication titration CHF exacerbation treatment  Discharge Plan: Anticipate discharge in 24-48 hrs to home.    Code Status: Level 1 - Full Code    Subjective   Patient reports overall feeling better.  She reports improvement of both lower extremity edema and shortness of breath.  She denies any fevers, chills, nausea, vomiting, diarrhea, constipation.  She  states she is having leg cramping yesterday however has improved.    Objective :  Temp:  [97.7 °F (36.5 °C)-99.2 °F (37.3 °C)] 97.7 °F (36.5 °C)  HR:  [44-59] 44  BP: (101-153)/(48-74) 130/64  Resp:  [18-20] 18  SpO2:  [96 %-100 %] 97 %  O2 Device: None (Room air)    Body mass index is 21.13 kg/m².     Input and Output Summary (last 24 hours):     Intake/Output Summary (Last 24 hours) at 11/30/2024 1150  Last data filed at 11/30/2024 0801  Gross per 24 hour   Intake 1300 ml   Output 4975 ml   Net -3675 ml       Physical Exam  Vitals and nursing note reviewed.   Constitutional:       General: She is not in acute distress.     Appearance: Normal appearance. She is well-developed. She is not ill-appearing or toxic-appearing.   HENT:      Head: Normocephalic and atraumatic.   Eyes:      Conjunctiva/sclera: Conjunctivae normal.   Cardiovascular:      Rate and Rhythm: Normal rate and regular rhythm.      Heart sounds: No murmur heard.  Pulmonary:      Effort: Pulmonary effort is normal. No respiratory distress.      Breath sounds: Rales (mild at bases) present. No wheezing.   Abdominal:      General: There is no distension.      Palpations: Abdomen is soft.      Tenderness: There is no abdominal tenderness. There is no guarding.   Musculoskeletal:      Cervical back: Neck supple.      Right lower leg: No edema.      Left lower leg: No edema.   Skin:     General: Skin is warm and dry.   Neurological:      Mental Status: She is alert and oriented to person, place, and time.   Psychiatric:         Mood and Affect: Mood normal.         Behavior: Behavior normal.           Lines/Drains:              Lab Results: I have reviewed the following results:   Results from last 7 days   Lab Units 11/30/24  0254   WBC Thousand/uL 9.09   HEMOGLOBIN g/dL 12.0   HEMATOCRIT % 39.3   PLATELETS Thousands/uL 227   SEGS PCT % 59   LYMPHO PCT % 25   MONO PCT % 13*   EOS PCT % 2     Results from last 7 days   Lab Units 11/30/24 0254  11/29/24  0915 11/28/24 2150   SODIUM mmol/L 139   < > 143   POTASSIUM mmol/L 3.3*   < > 3.7   CHLORIDE mmol/L 101   < > 109*   CO2 mmol/L 30   < > 24   BUN mg/dL 23   < > 17   CREATININE mg/dL 1.35*   < > 1.07   ANION GAP mmol/L 8   < > 10   CALCIUM mg/dL 8.2*   < > 9.3   ALBUMIN g/dL  --   --  4.1   TOTAL BILIRUBIN mg/dL  --   --  0.73   ALK PHOS U/L  --   --  97   ALT U/L  --   --  48   AST U/L  --   --  59*   GLUCOSE RANDOM mg/dL 84   < > 99    < > = values in this interval not displayed.     Results from last 7 days   Lab Units 11/28/24  2150   INR  1.32*         Results from last 7 days   Lab Units 11/29/24  0542   HEMOGLOBIN A1C % 6.2*     Results from last 7 days   Lab Units 11/28/24 2150   PROCALCITONIN ng/ml 0.06       Recent Cultures (last 7 days):         Imaging Results Review: I reviewed radiology reports from this admission including: CT chest.  Other Study Results Review: EKG was reviewed.     Last 24 Hours Medication List:     Current Facility-Administered Medications:     acetaminophen (TYLENOL) tablet 650 mg, Q4H PRN    aluminum-magnesium hydroxide-simethicone (MAALOX) oral suspension 30 mL, Q6H PRN    apixaban (ELIQUIS) tablet 5 mg, BID    aspirin chewable tablet 81 mg, Daily    atorvastatin (LIPITOR) tablet 10 mg, Daily    Brinzolamide-Brimonidine 1-0.2 % SUSP 1 drop, TID    [Held by provider] Empagliflozin (JARDIANCE) tablet 10 mg, Daily    [START ON 12/1/2024] furosemide (LASIX) injection 40 mg, Daily    gabapentin (NEURONTIN) capsule 600 mg, Daily    hydrALAZINE (APRESOLINE) injection 10 mg, Q6H PRN    hydrALAZINE (APRESOLINE) tablet 25 mg, Q8H HOME    ketorolac (ACULAR) 0.5 % ophthalmic solution 1 drop, 4x Daily    Latanoprostene Bunod 0.024 % SOLN 1 drop, HS    [Held by provider] lisinopril (ZESTRIL) tablet 20 mg, BID    loratadine (CLARITIN) tablet 10 mg, Daily    nebivolol (BYSTOLIC) tablet 5 mg, Daily    Netarsudil Dimesylate 0.02 % SOLN 1 drop, HS    nicotine (NICODERM CQ) 7 mg/24hr TD  24 hr patch 1 patch, Daily    ondansetron (ZOFRAN) injection 4 mg, Q6H PRN    oxyCODONE-acetaminophen (PERCOCET) 5-325 mg per tablet 1 tablet, Q6H PRN    pantoprazole (PROTONIX) EC tablet 40 mg, Early Morning    potassium chloride (Klor-Con M20) CR tablet 40 mEq, BID    potassium chloride 20 mEq IVPB (premix), Q2H, Last Rate: 20 mEq (11/30/24 1008)    senna (SENOKOT) tablet 8.6 mg, HS PRN    [Held by provider] spironolactone (ALDACTONE) tablet 25 mg, Daily    timolol (TIMOPTIC) 0.5 % ophthalmic solution 1 drop, BID    umeclidinium 62.5 mcg/actuation inhaler AEPB 1 puff, Daily    Administrative Statements   Today, Patient Was Seen By: Lenka Mott, DO  I have spent a total time of 35 minutes in caring for this patient on the day of the visit/encounter including Diagnostic results, Risks and benefits of tx options, Instructions for management, Importance of tx compliance, Risk factor reductions, Counseling / Coordination of care, Reviewing / ordering tests, medicine, procedures  , and Obtaining or reviewing history  .    **Please Note: This note may have been constructed using a voice recognition system.**

## 2024-11-30 NOTE — ASSESSMENT & PLAN NOTE
BP markedly elevated in the ED at 229/109  Given NTG without significant improvement, BP did improve s/p hydralazine  Home regimen: Hydralazine 25 Mg daily, lisinopril 20 Mg twice daily, Bystolic 5 Mg daily, spironolactone 25 Mg daily,  Will hold lisinopril and Aldactone for elevated creatinine and reassess tomorrow  Hydralazine as needed

## 2024-11-30 NOTE — PLAN OF CARE
Problem: Potential for Falls  Goal: Patient will remain free of falls  Description: INTERVENTIONS:  - Educate patient/family on patient safety including physical limitations  - Instruct patient to call for assistance with activity   - Consult OT/PT to assist with strengthening/mobility   - Keep Call bell within reach  - Keep bed low and locked with side rails adjusted as appropriate  - Keep care items and personal belongings within reach  - Initiate and maintain comfort rounds  - Make Fall Risk Sign visible to staff  - Offer Toileting every 2 Hours, in advance of need  - Initiate/Maintain alarm  - Obtain necessary fall risk management equipment:   - Apply yellow socks and bracelet for high fall risk patients  - Consider moving patient to room near nurses station  Outcome: Progressing     Problem: PAIN - ADULT  Goal: Verbalizes/displays adequate comfort level or baseline comfort level  Description: Interventions:  - Encourage patient to monitor pain and request assistance  - Assess pain using appropriate pain scale  - Administer analgesics based on type and severity of pain and evaluate response  - Implement non-pharmacological measures as appropriate and evaluate response  - Consider cultural and social influences on pain and pain management  - Notify physician/advanced practitioner if interventions unsuccessful or patient reports new pain  Outcome: Progressing     Problem: INFECTION - ADULT  Goal: Absence or prevention of progression during hospitalization  Description: INTERVENTIONS:  - Assess and monitor for signs and symptoms of infection  - Monitor lab/diagnostic results  - Monitor all insertion sites, i.e. indwelling lines, tubes, and drains  - Monitor endotracheal if appropriate and nasal secretions for changes in amount and color  - [place] appropriate cooling/warming therapies per order  - Administer medications as ordered  - Instruct and encourage patient and family to use good hand hygiene  technique  - Identify and instruct in appropriate isolation precautions for identified infection/condition  Outcome: Progressing  Goal: Absence of fever/infection during neutropenic period  Description: INTERVENTIONS:  - Monitor WBC    Outcome: Progressing     Problem: SAFETY ADULT  Goal: Patient will remain free of falls  Description: INTERVENTIONS:  - Educate patient/family on patient safety including physical limitations  - Instruct patient to call for assistance with activity   - Consult OT/PT to assist with strengthening/mobility   - Keep Call bell within reach  - Keep bed low and locked with side rails adjusted as appropriate  - Keep care items and personal belongings within reach  - Initiate and maintain comfort rounds  - Make Fall Risk Sign visible to staff  - Offer Toileting every  Hours, in advance of need  - Initiate/Maintain alarm  - Obtain necessary fall risk management equipment:   - Apply yellow socks and bracelet for high fall risk patients  - Consider moving patient to room near nurses station  Outcome: Progressing  Goal: Maintain or return to baseline ADL function  Description: INTERVENTIONS:  -  Assess patient's ability to carry out ADLs; assess patient's baseline for ADL function and identify physical deficits which impact ability to perform ADLs (bathing, care of mouth/teeth, toileting, grooming, dressing, etc.)  - Assess/evaluate cause of self-care deficits   - Assess range of motion  - Assess patient's mobility; develop plan if impaired  - Assess patient's need for assistive devices and provide as appropriate  - Encourage maximum independence but intervene and supervise when necessary  - Involve family in performance of ADLs  - Assess for home care needs following discharge   - Consider OT consult to assist with ADL evaluation and planning for discharge  - Provide patient education as appropriate  Outcome: Progressing  Goal: Maintains/Returns to pre admission functional level  Description:  INTERVENTIONS:  - Perform AM-PAC 6 Click Basic Mobility/ Daily Activity assessment daily.  - Set and communicate daily mobility goal to care team and patient/family/caregiver.   - Collaborate with rehabilitation services on mobility goals if consulted  - Perform Range of Motion 2 times a day.  - Reposition patient every 2 hours.  - Dangle patient 2 times a day  - Stand patient 2 times a day  - Ambulate patient 2 times a day  - Out of bed to chair 2 times a day   - Out of bed for meals 2 times a day  - Out of bed for toileting  - Record patient progress and toleration of activity level   Outcome: Progressing     Problem: Knowledge Deficit  Goal: Patient/family/caregiver demonstrates understanding of disease process, treatment plan, medications, and discharge instructions  Description: Complete learning assessment and assess knowledge base.  Interventions:  - Provide teaching at level of understanding  - Provide teaching via preferred learning methods  Outcome: Progressing     Problem: Nutrition/Hydration-ADULT  Goal: Nutrient/Hydration intake appropriate for improving, restoring or maintaining nutritional needs  Description: Monitor and assess patient's nutrition/hydration status for malnutrition. Collaborate with interdisciplinary team and initiate plan and interventions as ordered.  Monitor patient's weight and dietary intake as ordered or per policy. Utilize nutrition screening tool and intervene as necessary. Determine patient's food preferences and provide high-protein, high-caloric foods as appropriate.     INTERVENTIONS:  - Monitor oral intake, urinary output, labs, and treatment plans  - Assess nutrition and hydration status and recommend course of action  - Evaluate amount of meals eaten  - Assist patient with eating if necessary   - Allow adequate time for meals  - Recommend/ encourage appropriate diets, oral nutritional supplements, and vitamin/mineral supplements  - Order, calculate, and assess calorie  counts as needed  - Recommend, monitor, and adjust tube feedings and TPN/PPN based on assessed needs  - Assess need for intravenous fluids  - Provide specific nutrition/hydration education as appropriate  - Include patient/family/caregiver in decisions related to nutrition  Outcome: Progressing

## 2024-12-01 LAB
ANION GAP SERPL CALCULATED.3IONS-SCNC: 5 MMOL/L (ref 4–13)
BASOPHILS # BLD AUTO: 0.07 THOUSANDS/ΜL (ref 0–0.1)
BASOPHILS NFR BLD AUTO: 1 % (ref 0–1)
BUN SERPL-MCNC: 30 MG/DL (ref 5–25)
CALCIUM SERPL-MCNC: 8.1 MG/DL (ref 8.4–10.2)
CHLORIDE SERPL-SCNC: 105 MMOL/L (ref 96–108)
CO2 SERPL-SCNC: 28 MMOL/L (ref 21–32)
CREAT SERPL-MCNC: 1.23 MG/DL (ref 0.6–1.3)
EOSINOPHIL # BLD AUTO: 0.37 THOUSAND/ΜL (ref 0–0.61)
EOSINOPHIL NFR BLD AUTO: 4 % (ref 0–6)
ERYTHROCYTE [DISTWIDTH] IN BLOOD BY AUTOMATED COUNT: 16.6 % (ref 11.6–15.1)
GFR SERPL CREATININE-BSD FRML MDRD: 44 ML/MIN/1.73SQ M
GLUCOSE SERPL-MCNC: 92 MG/DL (ref 65–140)
HCT VFR BLD AUTO: 42.6 % (ref 34.8–46.1)
HGB BLD-MCNC: 12.5 G/DL (ref 11.5–15.4)
IMM GRANULOCYTES # BLD AUTO: 0.02 THOUSAND/UL (ref 0–0.2)
IMM GRANULOCYTES NFR BLD AUTO: 0 % (ref 0–2)
LYMPHOCYTES # BLD AUTO: 2.31 THOUSANDS/ΜL (ref 0.6–4.47)
LYMPHOCYTES NFR BLD AUTO: 27 % (ref 14–44)
MCH RBC QN AUTO: 25.2 PG (ref 26.8–34.3)
MCHC RBC AUTO-ENTMCNC: 29.3 G/DL (ref 31.4–37.4)
MCV RBC AUTO: 86 FL (ref 82–98)
MONOCYTES # BLD AUTO: 1.06 THOUSAND/ΜL (ref 0.17–1.22)
MONOCYTES NFR BLD AUTO: 12 % (ref 4–12)
NEUTROPHILS # BLD AUTO: 4.86 THOUSANDS/ΜL (ref 1.85–7.62)
NEUTS SEG NFR BLD AUTO: 56 % (ref 43–75)
NRBC BLD AUTO-RTO: 0 /100 WBCS
PLATELET # BLD AUTO: 252 THOUSANDS/UL (ref 149–390)
PMV BLD AUTO: 12.3 FL (ref 8.9–12.7)
POTASSIUM SERPL-SCNC: 5.1 MMOL/L (ref 3.5–5.3)
RBC # BLD AUTO: 4.96 MILLION/UL (ref 3.81–5.12)
SODIUM SERPL-SCNC: 138 MMOL/L (ref 135–147)
WBC # BLD AUTO: 8.69 THOUSAND/UL (ref 4.31–10.16)

## 2024-12-01 PROCEDURE — 99232 SBSQ HOSP IP/OBS MODERATE 35: CPT | Performed by: STUDENT IN AN ORGANIZED HEALTH CARE EDUCATION/TRAINING PROGRAM

## 2024-12-01 PROCEDURE — 85025 COMPLETE CBC W/AUTO DIFF WBC: CPT

## 2024-12-01 PROCEDURE — 80048 BASIC METABOLIC PNL TOTAL CA: CPT

## 2024-12-01 RX ORDER — LISINOPRIL 20 MG/1
20 TABLET ORAL DAILY
Status: DISCONTINUED | OUTPATIENT
Start: 2024-12-02 | End: 2024-12-03 | Stop reason: HOSPADM

## 2024-12-01 RX ADMIN — TIMOLOL MALEATE 1 DROP: 5 SOLUTION OPHTHALMIC at 09:13

## 2024-12-01 RX ADMIN — TIMOLOL MALEATE 1 DROP: 5 SOLUTION OPHTHALMIC at 19:28

## 2024-12-01 RX ADMIN — OXYCODONE HYDROCHLORIDE AND ACETAMINOPHEN 1 TABLET: 5; 325 TABLET ORAL at 04:57

## 2024-12-01 RX ADMIN — POTASSIUM CHLORIDE 40 MEQ: 1500 TABLET, EXTENDED RELEASE ORAL at 09:06

## 2024-12-01 RX ADMIN — APIXABAN 5 MG: 5 TABLET, FILM COATED ORAL at 18:06

## 2024-12-01 RX ADMIN — KETOROLAC TROMETHAMINE 1 DROP: 5 SOLUTION OPHTHALMIC at 13:17

## 2024-12-01 RX ADMIN — UMECLIDINIUM 1 PUFF: 62.5 AEROSOL, POWDER ORAL at 09:50

## 2024-12-01 RX ADMIN — FUROSEMIDE 40 MG: 10 INJECTION, SOLUTION INTRAVENOUS at 09:09

## 2024-12-01 RX ADMIN — KETOROLAC TROMETHAMINE 1 DROP: 5 SOLUTION OPHTHALMIC at 19:27

## 2024-12-01 RX ADMIN — PANTOPRAZOLE SODIUM 40 MG: 40 TABLET, DELAYED RELEASE ORAL at 04:57

## 2024-12-01 RX ADMIN — ATORVASTATIN CALCIUM 10 MG: 10 TABLET, FILM COATED ORAL at 09:07

## 2024-12-01 RX ADMIN — BRINZOLAMIDE/BRIMONIDINE TARTRATE 1 DROP: 10; 2 SUSPENSION/ DROPS OPHTHALMIC at 09:13

## 2024-12-01 RX ADMIN — ASPIRIN 81 MG CHEWABLE TABLET 81 MG: 81 TABLET CHEWABLE at 09:06

## 2024-12-01 RX ADMIN — NETARSUDIL 1 DROP: 0.2 SOLUTION/ DROPS OPHTHALMIC; TOPICAL at 21:27

## 2024-12-01 RX ADMIN — HYDRALAZINE HYDROCHLORIDE 25 MG: 25 TABLET ORAL at 13:16

## 2024-12-01 RX ADMIN — HYDRALAZINE HYDROCHLORIDE 25 MG: 25 TABLET ORAL at 04:57

## 2024-12-01 RX ADMIN — KETOROLAC TROMETHAMINE 1 DROP: 5 SOLUTION OPHTHALMIC at 21:27

## 2024-12-01 RX ADMIN — BRINZOLAMIDE/BRIMONIDINE TARTRATE 1 DROP: 10; 2 SUSPENSION/ DROPS OPHTHALMIC at 18:06

## 2024-12-01 RX ADMIN — POTASSIUM CHLORIDE 40 MEQ: 1500 TABLET, EXTENDED RELEASE ORAL at 18:06

## 2024-12-01 RX ADMIN — BRINZOLAMIDE/BRIMONIDINE TARTRATE 1 DROP: 10; 2 SUSPENSION/ DROPS OPHTHALMIC at 21:27

## 2024-12-01 RX ADMIN — APIXABAN 5 MG: 5 TABLET, FILM COATED ORAL at 09:06

## 2024-12-01 RX ADMIN — GABAPENTIN 600 MG: 300 CAPSULE ORAL at 09:06

## 2024-12-01 RX ADMIN — NEBIVOLOL 5 MG: 5 TABLET ORAL at 09:06

## 2024-12-01 RX ADMIN — LORATADINE 10 MG: 10 TABLET ORAL at 09:07

## 2024-12-01 RX ADMIN — HYDRALAZINE HYDROCHLORIDE 25 MG: 25 TABLET ORAL at 21:27

## 2024-12-01 RX ADMIN — LATANOPROSTENE BUNOD 1 DROP: 0.24 SOLUTION/ DROPS OPHTHALMIC at 21:27

## 2024-12-01 RX ADMIN — NICOTINE 1 PATCH: 7 PATCH, EXTENDED RELEASE TRANSDERMAL at 09:07

## 2024-12-01 RX ADMIN — KETOROLAC TROMETHAMINE 1 DROP: 5 SOLUTION OPHTHALMIC at 09:14

## 2024-12-01 NOTE — ASSESSMENT & PLAN NOTE
HS 57/164/151  Suspect elevated troponin secondary to acute on chronic heart failure, as well as hypertensive emergency  Echocardiogram with LVEF of 55%.  Cardiology consulted due to concurrent acute heart failure

## 2024-12-01 NOTE — PROGRESS NOTES
Progress Note - Hospitalist   Name: Joaquina Jorge 69 y.o. female I MRN: [id number]  Unit/Bed#: -01 I Date of Admission: 11/28/2024   Date of Service: 12/1/2024 I Hospital Day: 2     Assessment & Plan  Acute on chronic diastolic heart failure (HCC)  Wt Readings from Last 3 Encounters:   12/01/24 54.3 kg (119 lb 9.6 oz)     Presenting with dyspnea for few hours PTA to the ED w/ worsening leg swelling over the last few weeks  History of diastolic heart failure-maintained on Lasix 20 Mg daily, spironolactone 25 Mg daily, and Farxiga 10 Mg daily  CT chest consistent with pulmonary congestion and BNP 2251  Given Lasix 20 Mg IV x 1 in the ED with 1 L urine output so far-will change to Lasix 40 Mg IV twice daily  Obtain updated echo; EF of 50% grade 2 diastolic dysfunction  Documented 4.7 L urine output in past 24 hours; with no lower extremity edema and improvement of respiration;   Lasix was reduced from 40 twice daily to daily on 11/30 due to uptrending creatinine.  Continue IV Lasix 40 mg daily for now.  Continue to hold Aldactone and Jardiance.  Patient in net negative balance of 650 cc in last 24 hours, continue to monitor ins and outs and daily weights.  Elevated troponin  HS 57/164/151  Suspect elevated troponin secondary to acute on chronic heart failure, as well as hypertensive emergency  Echocardiogram with LVEF of 55%.  Cardiology consulted due to concurrent acute heart failure  Hypertensive emergency  BP markedly elevated in the ED at 229/109  Given NTG without significant improvement, BP did improve s/p hydralazine  Home regimen: Hydralazine 25 Mg daily, lisinopril 20 Mg twice daily, Bystolic 5 Mg daily, spironolactone 25 Mg daily.  Lisinopril and Aldactone was held yesterday due to up trending creatinine.  Creatinine improved to 1.2 this morning, will resume lisinopril at half a dose, as she was taking 20 mg twice daily, will start on 20 mg daily.  Continue to hold Aldactone for now.  Hydralazine is  increased to 25 mg 3 times daily.  Leukocytosis  WBC 11.1 6K on admit  No signs of acute bacterial infection at this time  Resolved.  COPD with emphysema (HCC)  Home regimen: Incruse daily  Patient is without active wheezing on admit  Continue home Incruse for now as she has good inspiratory effort on exam  Low threshold to change to nebulizers if inspiratory effort changes at all  Paroxysmal atrial fibrillation (HCC)  RC: Nebivolol 5 Mg daily  AC: Eliquis 5 Mg twice daily  Continue home medications  Pulmonary embolism (HCC)  Continue home Eliquis 5 Mg twice daily  History of IVC filter placement in the past  Coronary artery disease  History of nonobstructive CAD  Of note, she did experience complete occlusion of right SFA during coronary angiogram requiring right femoral endarterectomy in 2014  Continue home aspirin, statin, lisinopril, and beta-blocker  LYNDA (obstructive sleep apnea)  Previously wore CPAP but this was stopped secondary to worsening glaucoma  NC HS PRN  Tobacco abuse  Smokes approximately 5 cigarettes daily  NRT while inpatient  Benign essential HTN  As documented in hypertensive emergency.    VTE Pharmacologic Prophylaxis: VTE Score: 5 Moderate Risk (Score 3-4) - Pharmacological DVT Prophylaxis Ordered: apixaban (Eliquis).    Mobility:   Basic Mobility Inpatient Raw Score: 24  JH-HLM Goal: 8: Walk 250 feet or more  JH-HLM Achieved: 7: Walk 25 feet or more  JH-HLM Goal NOT achieved. Continue with multidisciplinary rounding and encourage appropriate mobility to improve upon JH-HLM goals.    Patient Centered Rounds: I performed bedside rounds with nursing staff today.   Discussions with Specialists or Other Care Team Provider:     Education and Discussions with Family / Patient: Patient declined call to .  Per patient her son was visiting her this afternoon.    Current Length of Stay: 2 day(s)  Current Patient Status: Inpatient   Certification Statement: The patient will continue to  require additional inpatient hospital stay due to continues to be on IV diuretics and adjustment for hypertension meds.  Discharge Plan: Anticipate discharge in 24-48 hrs to home.    Code Status: Level 1 - Full Code    Subjective   Patient seen at bedside, reports headache but no vision changes.  According to her headache is chronic.  Denies any difficulty breathing or palpitations.    Objective :  Temp:  [97.7 °F (36.5 °C)-98.4 °F (36.9 °C)] 98.4 °F (36.9 °C)  HR:  [52-56] 56  BP: (134-183)/(60-86) 183/80  Resp:  [16-18] 18  SpO2:  [90 %-99 %] 99 %  O2 Device: None (Room air)    Body mass index is 21.19 kg/m².     Input and Output Summary (last 24 hours):     Intake/Output Summary (Last 24 hours) at 12/1/2024 1044  Last data filed at 12/1/2024 0408  Gross per 24 hour   Intake 950 ml   Output 1400 ml   Net -450 ml       Physical Exam  Constitutional:       Appearance: Normal appearance.   HENT:      Head: Normocephalic and atraumatic.      Mouth/Throat:      Mouth: Mucous membranes are moist.      Pharynx: Oropharynx is clear.   Eyes:      Conjunctiva/sclera: Conjunctivae normal.   Cardiovascular:      Rate and Rhythm: Normal rate and regular rhythm.      Pulses: Normal pulses.      Heart sounds: Normal heart sounds.   Pulmonary:      Effort: Pulmonary effort is normal.      Comments: Decreased breath sounds bilaterally but no rhonchi or wheezes.  Abdominal:      General: Bowel sounds are normal.      Palpations: Abdomen is soft.   Musculoskeletal:      Right lower leg: Edema present.      Left lower leg: Edema present.   Neurological:      Mental Status: She is alert and oriented to person, place, and time. Mental status is at baseline.   Psychiatric:         Mood and Affect: Mood normal.         Behavior: Behavior normal.       Lines/Drains:              Lab Results: I have reviewed the following results:   Results from last 7 days   Lab Units 12/01/24  0353   WBC Thousand/uL 8.69   HEMOGLOBIN g/dL 12.5    HEMATOCRIT % 42.6   PLATELETS Thousands/uL 252   SEGS PCT % 56   LYMPHO PCT % 27   MONO PCT % 12   EOS PCT % 4     Results from last 7 days   Lab Units 12/01/24  0353 11/29/24  0915 11/28/24  2150   SODIUM mmol/L 138   < > 143   POTASSIUM mmol/L 5.1   < > 3.7   CHLORIDE mmol/L 105   < > 109*   CO2 mmol/L 28   < > 24   BUN mg/dL 30*   < > 17   CREATININE mg/dL 1.23   < > 1.07   ANION GAP mmol/L 5   < > 10   CALCIUM mg/dL 8.1*   < > 9.3   ALBUMIN g/dL  --   --  4.1   TOTAL BILIRUBIN mg/dL  --   --  0.73   ALK PHOS U/L  --   --  97   ALT U/L  --   --  48   AST U/L  --   --  59*   GLUCOSE RANDOM mg/dL 92   < > 99    < > = values in this interval not displayed.     Results from last 7 days   Lab Units 11/28/24  2150   INR  1.32*         Results from last 7 days   Lab Units 11/29/24  0542   HEMOGLOBIN A1C % 6.2*     Results from last 7 days   Lab Units 11/28/24  2150   PROCALCITONIN ng/ml 0.06       Recent Cultures (last 7 days):         Imaging Results Review: I reviewed radiology reports from this admission including: CT chest.  Other Study Results Review: EKG was reviewed.     Last 24 Hours Medication List:     Current Facility-Administered Medications:     acetaminophen (TYLENOL) tablet 650 mg, Q4H PRN    aluminum-magnesium hydroxide-simethicone (MAALOX) oral suspension 30 mL, Q6H PRN    apixaban (ELIQUIS) tablet 5 mg, BID    aspirin chewable tablet 81 mg, Daily    atorvastatin (LIPITOR) tablet 10 mg, Daily    Brinzolamide-Brimonidine 1-0.2 % SUSP 1 drop, TID    [Held by provider] Empagliflozin (JARDIANCE) tablet 10 mg, Daily    furosemide (LASIX) injection 40 mg, Daily    gabapentin (NEURONTIN) capsule 600 mg, Daily    hydrALAZINE (APRESOLINE) injection 10 mg, Q6H PRN    hydrALAZINE (APRESOLINE) tablet 25 mg, Q8H HOME    ketorolac (ACULAR) 0.5 % ophthalmic solution 1 drop, 4x Daily    Latanoprostene Bunod 0.024 % SOLN 1 drop, HS    [Held by provider] lisinopril (ZESTRIL) tablet 20 mg, Daily    loratadine (CLARITIN)  tablet 10 mg, Daily    nebivolol (BYSTOLIC) tablet 5 mg, Daily    Netarsudil Dimesylate 0.02 % SOLN 1 drop, HS    nicotine (NICODERM CQ) 7 mg/24hr TD 24 hr patch 1 patch, Daily    ondansetron (ZOFRAN) injection 4 mg, Q6H PRN    oxyCODONE-acetaminophen (PERCOCET) 5-325 mg per tablet 1 tablet, Q6H PRN    pantoprazole (PROTONIX) EC tablet 40 mg, Early Morning    potassium chloride (Klor-Con M20) CR tablet 40 mEq, BID    senna (SENOKOT) tablet 8.6 mg, HS PRN    [Held by provider] spironolactone (ALDACTONE) tablet 25 mg, Daily    timolol (TIMOPTIC) 0.5 % ophthalmic solution 1 drop, BID    umeclidinium 62.5 mcg/actuation inhaler AEPB 1 puff, Daily    Administrative Statements   Today, Patient Was Seen By: Olga Lidia Andrade MD  I have spent a total time of 38 minutes in caring for this patient on the day of the visit/encounter including Diagnostic results, Patient and family education, Documenting in the medical record, Reviewing / ordering tests, medicine, procedures  , and Obtaining or reviewing history  .    **Please Note: This note may have been constructed using a voice recognition system.**

## 2024-12-01 NOTE — ASSESSMENT & PLAN NOTE
Wt Readings from Last 3 Encounters:   12/01/24 54.3 kg (119 lb 9.6 oz)     Presenting with dyspnea for few hours PTA to the ED w/ worsening leg swelling over the last few weeks  History of diastolic heart failure-maintained on Lasix 20 Mg daily, spironolactone 25 Mg daily, and Farxiga 10 Mg daily  CT chest consistent with pulmonary congestion and BNP 2251  Given Lasix 20 Mg IV x 1 in the ED with 1 L urine output so far-will change to Lasix 40 Mg IV twice daily  Obtain updated echo; EF of 50% grade 2 diastolic dysfunction  Documented 4.7 L urine output in past 24 hours; with no lower extremity edema and improvement of respiration;   Lasix was reduced from 40 twice daily to daily on 11/30 due to uptrending creatinine.  Continue IV Lasix 40 mg daily for now.  Continue to hold Aldactone and Jardiance.  Patient in net negative balance of 650 cc in last 24 hours, continue to monitor ins and outs and daily weights.

## 2024-12-02 LAB
ANION GAP SERPL CALCULATED.3IONS-SCNC: 5 MMOL/L (ref 4–13)
ANION GAP SERPL CALCULATED.3IONS-SCNC: 8 MMOL/L (ref 4–13)
BUN SERPL-MCNC: 27 MG/DL (ref 5–25)
BUN SERPL-MCNC: 30 MG/DL (ref 5–25)
CALCIUM SERPL-MCNC: 10.1 MG/DL (ref 8.4–10.2)
CALCIUM SERPL-MCNC: 9 MG/DL (ref 8.4–10.2)
CHLORIDE SERPL-SCNC: 106 MMOL/L (ref 96–108)
CHLORIDE SERPL-SCNC: 99 MMOL/L (ref 96–108)
CO2 SERPL-SCNC: 27 MMOL/L (ref 21–32)
CO2 SERPL-SCNC: 32 MMOL/L (ref 21–32)
CREAT SERPL-MCNC: 1.21 MG/DL (ref 0.6–1.3)
CREAT SERPL-MCNC: 1.38 MG/DL (ref 0.6–1.3)
ERYTHROCYTE [DISTWIDTH] IN BLOOD BY AUTOMATED COUNT: 16.4 % (ref 11.6–15.1)
GFR SERPL CREATININE-BSD FRML MDRD: 39 ML/MIN/1.73SQ M
GFR SERPL CREATININE-BSD FRML MDRD: 45 ML/MIN/1.73SQ M
GLUCOSE SERPL-MCNC: 71 MG/DL (ref 65–140)
GLUCOSE SERPL-MCNC: 95 MG/DL (ref 65–140)
HCT VFR BLD AUTO: 41.9 % (ref 34.8–46.1)
HGB BLD-MCNC: 12.5 G/DL (ref 11.5–15.4)
MAGNESIUM SERPL-MCNC: 2.2 MG/DL (ref 1.9–2.7)
MCH RBC QN AUTO: 25.4 PG (ref 26.8–34.3)
MCHC RBC AUTO-ENTMCNC: 29.8 G/DL (ref 31.4–37.4)
MCV RBC AUTO: 85 FL (ref 82–98)
PLATELET # BLD AUTO: 242 THOUSANDS/UL (ref 149–390)
PMV BLD AUTO: 11.6 FL (ref 8.9–12.7)
POTASSIUM SERPL-SCNC: 4.4 MMOL/L (ref 3.5–5.3)
POTASSIUM SERPL-SCNC: 5.6 MMOL/L (ref 3.5–5.3)
RBC # BLD AUTO: 4.93 MILLION/UL (ref 3.81–5.12)
SODIUM SERPL-SCNC: 138 MMOL/L (ref 135–147)
SODIUM SERPL-SCNC: 139 MMOL/L (ref 135–147)
WBC # BLD AUTO: 7.49 THOUSAND/UL (ref 4.31–10.16)

## 2024-12-02 PROCEDURE — 99232 SBSQ HOSP IP/OBS MODERATE 35: CPT | Performed by: INTERNAL MEDICINE

## 2024-12-02 PROCEDURE — 80048 BASIC METABOLIC PNL TOTAL CA: CPT | Performed by: STUDENT IN AN ORGANIZED HEALTH CARE EDUCATION/TRAINING PROGRAM

## 2024-12-02 PROCEDURE — 83735 ASSAY OF MAGNESIUM: CPT | Performed by: STUDENT IN AN ORGANIZED HEALTH CARE EDUCATION/TRAINING PROGRAM

## 2024-12-02 PROCEDURE — 85027 COMPLETE CBC AUTOMATED: CPT | Performed by: STUDENT IN AN ORGANIZED HEALTH CARE EDUCATION/TRAINING PROGRAM

## 2024-12-02 PROCEDURE — 80048 BASIC METABOLIC PNL TOTAL CA: CPT | Performed by: INTERNAL MEDICINE

## 2024-12-02 RX ORDER — HYDRALAZINE HYDROCHLORIDE 25 MG/1
25 TABLET, FILM COATED ORAL
Status: DISCONTINUED | OUTPATIENT
Start: 2024-12-03 | End: 2024-12-03 | Stop reason: HOSPADM

## 2024-12-02 RX ORDER — HYDRALAZINE HYDROCHLORIDE 25 MG/1
25 TABLET, FILM COATED ORAL
Status: DISCONTINUED | OUTPATIENT
Start: 2024-12-03 | End: 2024-12-02

## 2024-12-02 RX ADMIN — LATANOPROSTENE BUNOD 1 DROP: 0.24 SOLUTION/ DROPS OPHTHALMIC at 21:03

## 2024-12-02 RX ADMIN — KETOROLAC TROMETHAMINE 1 DROP: 5 SOLUTION OPHTHALMIC at 17:20

## 2024-12-02 RX ADMIN — APIXABAN 5 MG: 5 TABLET, FILM COATED ORAL at 17:20

## 2024-12-02 RX ADMIN — HYDRALAZINE HYDROCHLORIDE 25 MG: 25 TABLET ORAL at 06:06

## 2024-12-02 RX ADMIN — UMECLIDINIUM 1 PUFF: 62.5 AEROSOL, POWDER ORAL at 08:43

## 2024-12-02 RX ADMIN — LORATADINE 10 MG: 10 TABLET ORAL at 08:36

## 2024-12-02 RX ADMIN — PANTOPRAZOLE SODIUM 40 MG: 40 TABLET, DELAYED RELEASE ORAL at 06:06

## 2024-12-02 RX ADMIN — ATORVASTATIN CALCIUM 10 MG: 10 TABLET, FILM COATED ORAL at 08:36

## 2024-12-02 RX ADMIN — NETARSUDIL 1 DROP: 0.2 SOLUTION/ DROPS OPHTHALMIC; TOPICAL at 21:03

## 2024-12-02 RX ADMIN — GABAPENTIN 600 MG: 300 CAPSULE ORAL at 08:36

## 2024-12-02 RX ADMIN — TIMOLOL MALEATE 1 DROP: 5 SOLUTION OPHTHALMIC at 08:44

## 2024-12-02 RX ADMIN — ASPIRIN 81 MG CHEWABLE TABLET 81 MG: 81 TABLET CHEWABLE at 08:37

## 2024-12-02 RX ADMIN — TIMOLOL MALEATE 1 DROP: 5 SOLUTION OPHTHALMIC at 23:09

## 2024-12-02 RX ADMIN — BRINZOLAMIDE/BRIMONIDINE TARTRATE 1 DROP: 10; 2 SUSPENSION/ DROPS OPHTHALMIC at 21:03

## 2024-12-02 RX ADMIN — KETOROLAC TROMETHAMINE 1 DROP: 5 SOLUTION OPHTHALMIC at 21:03

## 2024-12-02 RX ADMIN — BRINZOLAMIDE/BRIMONIDINE TARTRATE 1 DROP: 10; 2 SUSPENSION/ DROPS OPHTHALMIC at 16:03

## 2024-12-02 RX ADMIN — HYDRALAZINE HYDROCHLORIDE 25 MG: 25 TABLET ORAL at 14:05

## 2024-12-02 RX ADMIN — APIXABAN 5 MG: 5 TABLET, FILM COATED ORAL at 08:36

## 2024-12-02 RX ADMIN — KETOROLAC TROMETHAMINE 1 DROP: 5 SOLUTION OPHTHALMIC at 13:05

## 2024-12-02 RX ADMIN — FUROSEMIDE 40 MG: 10 INJECTION, SOLUTION INTRAVENOUS at 08:33

## 2024-12-02 RX ADMIN — KETOROLAC TROMETHAMINE 1 DROP: 5 SOLUTION OPHTHALMIC at 08:43

## 2024-12-02 RX ADMIN — BRINZOLAMIDE/BRIMONIDINE TARTRATE 1 DROP: 10; 2 SUSPENSION/ DROPS OPHTHALMIC at 08:43

## 2024-12-02 NOTE — PLAN OF CARE
Problem: Potential for Falls  Goal: Patient will remain free of falls  Description: INTERVENTIONS:  - Educate patient/family on patient safety including physical limitations  - Instruct patient to call for assistance with activity   - Consult OT/PT to assist with strengthening/mobility   - Keep Call bell within reach  - Keep bed low and locked with side rails adjusted as appropriate  - Keep care items and personal belongings within reach  - Initiate and maintain comfort rounds  - Make Fall Risk Sign visible to staff  Problem: DISCHARGE PLANNING  Goal: Discharge to home or other facility with appropriate resources  Description: INTERVENTIONS:  - Identify barriers to discharge w/patient and caregiver  - Arrange for needed discharge resources and transportation as appropriate  - Identify discharge learning needs (meds, wound care, etc.)  - Arrange for interpretive services to assist at discharge as needed  - Refer to Case Management Department for coordinating discharge planning if the patient needs post-hospital services based on physician/advanced practitioner order or complex needs related to functional status, cognitive ability, or social support system  Outcome: Progressing     Problem: Knowledge Deficit  Goal: Patient/family/caregiver demonstrates understanding of disease process, treatment plan, medications, and discharge instructions  Description: Complete learning assessment and assess knowledge base.  Interventions:  - Provide teaching at level of understanding  - Provide teaching via preferred learning methods  Outcome: Progressing     - Apply yellow socks and bracelet for high fall risk patients  - Consider moving patient to room near nurses station  Outcome: Progressing     Problem: PAIN - ADULT  Goal: Verbalizes/displays adequate comfort level or baseline comfort level  Description: Interventions:  - Encourage patient to monitor pain and request assistance  - Assess pain using appropriate pain scale  -  Administer analgesics based on type and severity of pain and evaluate response  - Implement non-pharmacological measures as appropriate and evaluate response  - Consider cultural and social influences on pain and pain management  - Notify physician/advanced practitioner if interventions unsuccessful or patient reports new pain  Outcome: Progressing

## 2024-12-02 NOTE — PROGRESS NOTES
Progress Note - Hospitalist   Name: Joaquina Jorge 69 y.o. female I MRN: [id number]  Unit/Bed#: -01 I Date of Admission: 11/28/2024   Date of Service: 12/2/2024 I Hospital Day: 3    Assessment & Plan  Acute on chronic diastolic heart failure (HCC)  Wt Readings from Last 3 Encounters:   12/02/24 52.7 kg (116 lb 2.9 oz)     Presenting with dyspnea for few hours PTA to the ED w/ worsening leg swelling over the last few weeks  History of diastolic heart failure-maintained on Lasix 20 Mg daily, spironolactone 25 Mg daily, and Farxiga 10 Mg daily  CT chest consistent with pulmonary congestion and BNP 2251  Given Lasix 20 Mg IV x 1 in the ED with 1 L urine output so far-will change to Lasix 40 Mg IV twice daily  Obtain updated echo; EF of 50% grade 2 diastolic dysfunction  Documented 4.7 L urine output in past 24 hours; with no lower extremity edema and improvement of respiration;   Lasix was reduced from 40 twice daily to daily on 11/30 due to uptrending creatinine.  Continue IV Lasix 40 mg daily for now.  Discussed with cardiology they recommended switch to torsemide 20 mg at discharge with outpatient follow-up  Continue to hold Aldactone and Jardiance.  Patient in net negative balance of 650 cc in last 24 hours, continue to monitor ins and outs and daily weights.  Elevated troponin  HS 57/164/151  Suspect elevated troponin secondary to acute on chronic heart failure, as well as hypertensive emergency  Echocardiogram with LVEF of 55%.  Cardiology consulted due to concurrent acute heart failure  Hypertensive emergency  BP markedly elevated in the ED at 229/109  Given NTG without significant improvement, BP did improve s/p hydralazine  Home regimen: Hydralazine 25 Mg daily, lisinopril 20 Mg twice daily, Bystolic 5 Mg daily, spironolactone 25 Mg daily.  Lisinopril and Aldactone was held yesterday due to up trending creatinine.  Creatinine improved to 1.2 this morning, will resume lisinopril at half a dose, as she  was taking 20 mg twice daily, will start on 20 mg daily.  Continue to hold Aldactone for now.  On hydralazine 25 mg daily  Leukocytosis  WBC 11.1 6K on admit  No signs of acute bacterial infection at this time  Resolved.  COPD with emphysema (HCC)  Home regimen: Incruse daily  Patient is without active wheezing on admit  Continue home Incruse for now as she has good inspiratory effort on exam  Low threshold to change to nebulizers if inspiratory effort changes at all  Paroxysmal atrial fibrillation (HCC)  RC: Nebivolol 5 Mg daily  AC: Eliquis 5 Mg twice daily  Continue home medications  Pulmonary embolism (HCC)  Continue home Eliquis 5 Mg twice daily  History of IVC filter placement in the past  Coronary artery disease  History of nonobstructive CAD  Of note, she did experience complete occlusion of right SFA during coronary angiogram requiring right femoral endarterectomy in 2014  Continue home aspirin, statin, lisinopril, and beta-blocker  LYNDA (obstructive sleep apnea)  Previously wore CPAP but this was stopped secondary to worsening glaucoma  NC HS PRN  Tobacco abuse  Smokes approximately 5 cigarettes daily  NRT while inpatient  Benign essential HTN  As documented in hypertensive emergency.    Labs & Imaging: Results Review Statement: No pertinent imaging studies reviewed.    VTE Prophylaxis: in place.    Code Status:   Level 1 - Full Code    Patient Centered Rounds: I have performed bedside rounds with nursing staff today.    Mobility:   Basic Mobility Inpatient Raw Score: 24  JH-HLM Goal: 8: Walk 250 feet or more  JH-HLM Achieved: 8: Walk 250 feet ot more  JH-HLM Goal achieved. Continue to encourage appropriate mobility.    Discussions with Specialists or Other Care Team Provider: Cardiology    Education and Discussions with Family / Patient: Family at bedside    Total Time Spent on Date of Encounter in care of patient: 35 mins. This time was spent on one or more of the following: performing physical exam;  "counseling and coordination of care; obtaining or reviewing history; documenting in the medical record; reviewing/ordering tests, medications or procedures; communicating with other healthcare professionals and discussing with patient's family/caregivers.    Current Length of Stay: 3 day(s)    Current Patient Status: Inpatient   Certification Statement: The patient will continue to require additional inpatient hospital stay due to see my assessment and plan.     Subjective:   Patient is seen and examined at bedside.  Denies any new complaints.  Afebrile  All other ROS are negative.    Objective:    Vitals: Blood pressure 139/58, pulse (!) 48, temperature 98.8 °F (37.1 °C), resp. rate 18, height 5' 3\" (1.6 m), weight 52.7 kg (116 lb 2.9 oz), SpO2 98%.,Body mass index is 20.58 kg/m².  SPO2 RA Rest      Flowsheet Row ED to Hosp-Admission (Current) from 11/28/2024 in [facility] Med Surg Unit   SpO2 98 %   SpO2 Activity At Rest   O2 Device None (Room air)   O2 Flow Rate --          I&O:   Intake/Output Summary (Last 24 hours) at 12/2/2024 0859  Last data filed at 12/2/2024 0549  Gross per 24 hour   Intake 1182 ml   Output 2410 ml   Net -1228 ml       Physical Exam:    General- Alert, lying comfortably in bed. Not in any acute distress.  Neck- Supple, No JVD  CVS- regular, S1 and S2 normal  Chest- Bilateral Air entry, No rhochi, crackles or wheezing present.  Abdomen- soft, nontender, not distended, no guarding or rigidity, BS+  Extremities-  No pedal edema, No calf tenderness  CNS-   Alert, awake and orientedx3. No focal deficits present.    Invasive Devices       Peripheral Intravenous Line  Duration             Peripheral IV 11/30/24 Distal;Right;Ventral (anterior) Forearm 1 day                          Social History  reviewed  History reviewed. No pertinent family history. reviewed    Meds:  Current Facility-Administered Medications   Medication Dose Route Frequency Provider Last Rate Last Admin    " acetaminophen (TYLENOL) tablet 650 mg  650 mg Oral Q4H PRN Kathia Dominguez PA-C        aluminum-magnesium hydroxide-simethicone (MAALOX) oral suspension 30 mL  30 mL Oral Q6H PRN Kathia Dominguez PA-C        apixaban (ELIQUIS) tablet 5 mg  5 mg Oral BID ABIGAIL MominC   5 mg at 12/02/24 0836    aspirin chewable tablet 81 mg  81 mg Oral Daily ABIGAIL MominC   81 mg at 12/02/24 0837    atorvastatin (LIPITOR) tablet 10 mg  10 mg Oral Daily ABIGAIL MominC   10 mg at 12/02/24 0836    Brinzolamide-Brimonidine 1-0.2 % SUSP 1 drop  1 drop Both Eyes TID ABIGAIL MominC   1 drop at 12/02/24 0843    [Held by provider] Empagliflozin (JARDIANCE) tablet 10 mg  10 mg Oral Daily ABIGAIL MominC   10 mg at 11/29/24 0941    furosemide (LASIX) injection 40 mg  40 mg Intravenous Daily Het INDIRA Mott DO   40 mg at 12/02/24 0833    gabapentin (NEURONTIN) capsule 600 mg  600 mg Oral Daily Kathia Dominguez PA-C   600 mg at 12/02/24 0836    hydrALAZINE (APRESOLINE) injection 10 mg  10 mg Intravenous Q6H PRN Kathia Dominguez PA-C        hydrALAZINE (APRESOLINE) tablet 25 mg  25 mg Oral Q8H MACI Troncoso   25 mg at 12/02/24 0606    ketorolac (ACULAR) 0.5 % ophthalmic solution 1 drop  1 drop Both Eyes 4x Daily Kathia Dominguez PA-C   1 drop at 12/02/24 0843    Latanoprostene Bunod 0.024 % SOLN 1 drop  1 drop Ophthalmic HS Kathia Dominguez PA-C   1 drop at 12/01/24 2127    [Held by provider] lisinopril (ZESTRIL) tablet 20 mg  20 mg Oral Daily Olga Lidia Andrade MD        loratadine (CLARITIN) tablet 10 mg  10 mg Oral Daily ABIGAIL MominC   10 mg at 12/02/24 0836    nebivolol (BYSTOLIC) tablet 5 mg  5 mg Oral Daily Kathia Dominguez PA-C   5 mg at 12/01/24 0906    Netarsudil Dimesylate 0.02 % SOLN 1 drop  1 drop Ophthalmic HS Lynn Charles MD   1 drop at 12/01/24 2127    nicotine (NICODERM CQ) 7 mg/24hr TD 24 hr patch 1 patch  1 patch Transdermal Daily Kathia Dominguez PA-C    1 patch at 12/01/24 0907    ondansetron (ZOFRAN) injection 4 mg  4 mg Intravenous Q6H PRN Kathia Dominguez PA-C        oxyCODONE-acetaminophen (PERCOCET) 5-325 mg per tablet 1 tablet  1 tablet Oral Q6H PRN Kathia Dominguez PA-C   1 tablet at 12/01/24 0457    pantoprazole (PROTONIX) EC tablet 40 mg  40 mg Oral Early Morning Kathia Dominguez PA-C   40 mg at 12/02/24 0606    senna (SENOKOT) tablet 8.6 mg  1 tablet Oral HS PRN Kathia Dominguez PA-C        [Held by provider] spironolactone (ALDACTONE) tablet 25 mg  25 mg Oral Daily Kathia Dominguez PA-C   25 mg at 11/29/24 0941    timolol (TIMOPTIC) 0.5 % ophthalmic solution 1 drop  1 drop Both Eyes BID Kathia Dominguez PA-C   1 drop at 12/02/24 0844    umeclidinium 62.5 mcg/actuation inhaler AEPB 1 puff  1 puff Inhalation Daily Kathia Dominguez PA-C   1 puff at 12/02/24 0843        Medications Prior to Admission:     apixaban (ELIQUIS) 5 mg    aspirin 81 mg chewable tablet    atorvastatin (LIPITOR) 10 mg tablet    Brinzolamide-Brimonidine 1-0.2 % SUSP    dapagliflozin (Farxiga) 10 MG tablet    furosemide (LASIX) 20 mg tablet    gabapentin (NEURONTIN) 600 MG tablet    hydrALAZINE (APRESOLINE) 25 mg tablet    ketorolac (ACULAR) 0.5 % ophthalmic solution    lisinopril (ZESTRIL) 20 mg tablet    loratadine (CLARITIN) 10 mg tablet    Naphazoline-Polyethyl Glycol (Eye Drops) 0.012-0.2 % SOLN    nebivolol (BYSTOLIC) 5 mg tablet    Netarsudil Dimesylate 0.02 % SOLN    omeprazole (PriLOSEC) 40 MG capsule    oxyCODONE-acetaminophen (PERCOCET) 5-325 mg per tablet    potassium chloride (MICRO-K) 10 MEQ CR capsule    spironolactone (ALDACTONE) 25 mg tablet    timolol (BETIMOL) 0.5 % ophthalmic solution    umeclidinium (Incruse Ellipta) 62.5 mcg/actuation AEPB inhaler    Labs:  Results from last 7 days   Lab Units 12/02/24  0548 12/01/24  0353 11/30/24  0254 11/29/24  0542 11/28/24  2150   WBC Thousand/uL 7.49 8.69 9.09   < > 11.16*   HEMOGLOBIN g/dL 12.5 12.5 12.0   <  "> 11.9   HEMATOCRIT % 41.9 42.6 39.3   < > 39.8   PLATELETS Thousands/uL 242 252 227   < > 233   SEGS PCT %  --  56 59  --  78*   LYMPHO PCT %  --  27 25  --  11*   MONO PCT %  --  12 13*  --  9   EOS PCT %  --  4 2  --  1    < > = values in this interval not displayed.     Results from last 7 days   Lab Units 12/02/24  0548 12/01/24  0353 11/30/24  0254 11/29/24  0915 11/28/24  2150 11/28/24  2147   POTASSIUM mmol/L 5.6* 5.1 3.3*   < > 3.7  --    CHLORIDE mmol/L 106 105 101   < > 109*  --    CO2 mmol/L 27 28 30   < > 24  --    CO2, I-STAT mmol/L  --   --   --   --   --  24   BUN mg/dL 30* 30* 23   < > 17  --    CREATININE mg/dL 1.21 1.23 1.35*   < > 1.07  --    CALCIUM mg/dL 9.0 8.1* 8.2*   < > 9.3  --    ALK PHOS U/L  --   --   --   --  97  --    ALT U/L  --   --   --   --  48  --    AST U/L  --   --   --   --  59*  --    GLUCOSE, ISTAT mg/dl  --   --   --   --   --  101    < > = values in this interval not displayed.     No results found for: \"TROPONINI\", \"CKMB\", \"CKTOTAL\"  Results from last 7 days   Lab Units 11/28/24  2150   INR  1.32*     No results found for: \"BLOODCX\", \"URINECX\", \"WOUNDCULT\", \"SPUTUMCULTUR\"      Imaging:  Results for orders placed during the hospital encounter of 11/28/24    X-ray chest 1 view portable    Narrative  XR CHEST PORTABLE    INDICATION: CHF.    COMPARISON: None    FINDINGS:    There is mild to moderate CHF with prominence of the pulmonary vasculature and interstitial markings. No pneumothorax or pleural effusion.    Heart size is not well evaluated on this single portable AP view.    Bones are unremarkable for age.    Impression  Mild to moderate CHF.    Clinical service was aware of the findings at the time of dictation.          Workstation performed: [id number]    No results found for this or any previous visit.      Last 24 Hours Medication List:   Current Facility-Administered Medications   Medication Dose Route Frequency Provider Last Rate    acetaminophen  650 mg Oral " Q4H PRN Kathia Dominguez PA-C      aluminum-magnesium hydroxide-simethicone  30 mL Oral Q6H PRN Kathia Dominguez PA-C      apixaban  5 mg Oral BID Kathia Dominguez PA-C      aspirin  81 mg Oral Daily Kathia Dominguez PA-C      atorvastatin  10 mg Oral Daily Kathia Dominguez PA-C      Brinzolamide-Brimonidine  1 drop Both Eyes TID Kathia Domniguez PA-C      [Held by provider] Empagliflozin  10 mg Oral Daily Kathia Dominguez PA-C      furosemide  40 mg Intravenous Daily Het D Mott, DO      gabapentin  600 mg Oral Daily Kathia Dominguez PA-C      hydrALAZINE  10 mg Intravenous Q6H PRN Kathia Dominguez PA-C      hydrALAZINE  25 mg Oral Q8H MACI Troncoso      ketorolac  1 drop Both Eyes 4x Daily Kathia Dominguez PA-C      Latanoprostene Bunod  1 drop Ophthalmic HS Kathia Dominguez PA-C      [Held by provider] lisinopril  20 mg Oral Daily Olga Lidia Andrade MD      loratadine  10 mg Oral Daily Kathia Dominguez PA-C      nebivolol  5 mg Oral Daily Kathia Dominguez PA-C      Netarsudil Dimesylate  1 drop Ophthalmic HS Lynn Charles MD      nicotine  1 patch Transdermal Daily Kathia Dominguez PA-C      ondansetron  4 mg Intravenous Q6H PRN Kathia Dominguez PA-C      oxyCODONE-acetaminophen  1 tablet Oral Q6H PRN Kathia Dominguez PA-C      pantoprazole  40 mg Oral Early Morning Kathia Dominguez PA-C      senna  1 tablet Oral HS PRN Kathia Dominguez PA-C      [Held by provider] spironolactone  25 mg Oral Daily Kathia Dominguez PA-C      timolol  1 drop Both Eyes BID Kathia Dominguez PA-C      umeclidinium  1 puff Inhalation Daily Kathia Dominguez PA-C          Today, Patient Was Seen By: Delmer Evans MD    ** Please Note: Dictation voice to text software may have been used in the creation of this document. **

## 2024-12-02 NOTE — ASSESSMENT & PLAN NOTE
BP markedly elevated in the ED at 229/109  Given NTG without significant improvement, BP did improve s/p hydralazine  Home regimen: Hydralazine 25 Mg daily, lisinopril 20 Mg twice daily, Bystolic 5 Mg daily, spironolactone 25 Mg daily.  Lisinopril and Aldactone was held yesterday due to up trending creatinine.  Creatinine improved to 1.2 this morning, will resume lisinopril at half a dose, as she was taking 20 mg twice daily, will start on 20 mg daily.  Continue to hold Aldactone for now.  On hydralazine 25 mg daily

## 2024-12-02 NOTE — PLAN OF CARE
Problem: Potential for Falls  Goal: Patient will remain free of falls  Description: INTERVENTIONS:  - Educate patient/family on patient safety including physical limitations  - Instruct patient to call for assistance with activity   - Consult OT/PT to assist with strengthening/mobility   - Keep Call bell within reach  - Keep bed low and locked with side rails adjusted as appropriate  - Keep care items and personal belongings within reach  - Initiate and maintain comfort rounds  - Make Fall Risk Sign visible to staff  - Offer Toileting every x Hours, in advance of need  - Initiate/Maintain xalarm  - Obtain necessary fall risk management equipment: x  - Apply yellow socks and bracelet for high fall risk patients  - Consider moving patient to room near nurses station  Outcome: Progressing     Problem: PAIN - ADULT  Goal: Verbalizes/displays adequate comfort level or baseline comfort level  Description: Interventions:  - Encourage patient to monitor pain and request assistance  - Assess pain using appropriate pain scale  - Administer analgesics based on type and severity of pain and evaluate response  - Implement non-pharmacological measures as appropriate and evaluate response  - Consider cultural and social influences on pain and pain management  - Notify physician/advanced practitioner if interventions unsuccessful or patient reports new pain  Outcome: Progressing     Problem: INFECTION - ADULT  Goal: Absence or prevention of progression during hospitalization  Description: INTERVENTIONS:  - Assess and monitor for signs and symptoms of infection  - Monitor lab/diagnostic results  - Monitor all insertion sites, i.e. indwelling lines, tubes, and drains  - Monitor endotracheal if appropriate and nasal secretions for changes in amount and color  - Cunningham appropriate cooling/warming therapies per order  - Administer medications as ordered  - Instruct and encourage patient and family to use good hand hygiene  technique  - Identify and instruct in appropriate isolation precautions for identified infection/condition  Outcome: Progressing  Goal: Absence of fever/infection during neutropenic period  Description: INTERVENTIONS:  - Monitor WBC    Outcome: Progressing     Problem: SAFETY ADULT  Goal: Patient will remain free of falls  Description: INTERVENTIONS:  - Educate patient/family on patient safety including physical limitations  - Instruct patient to call for assistance with activity   - Consult OT/PT to assist with strengthening/mobility   - Keep Call bell within reach  - Keep bed low and locked with side rails adjusted as appropriate  - Keep care items and personal belongings within reach  - Initiate and maintain comfort rounds  - Make Fall Risk Sign visible to staff  - Offer Toileting every x Hours, in advance of need  - Initiate/Maintain xalarm  - Obtain necessary fall risk management equipment: x  - Apply yellow socks and bracelet for high fall risk patients  - Consider moving patient to room near nurses station  Outcome: Progressing  Goal: Maintain or return to baseline ADL function  Description: INTERVENTIONS:  -  Assess patient's ability to carry out ADLs; assess patient's baseline for ADL function and identify physical deficits which impact ability to perform ADLs (bathing, care of mouth/teeth, toileting, grooming, dressing, etc.)  - Assess/evaluate cause of self-care deficits   - Assess range of motion  - Assess patient's mobility; develop plan if impaired  - Assess patient's need for assistive devices and provide as appropriate  - Encourage maximum independence but intervene and supervise when necessary  - Involve family in performance of ADLs  - Assess for home care needs following discharge   - Consider OT consult to assist with ADL evaluation and planning for discharge  - Provide patient education as appropriate  Outcome: Progressing  Goal: Maintains/Returns to pre admission functional level  Description:  INTERVENTIONS:  - Perform AM-PAC 6 Click Basic Mobility/ Daily Activity assessment daily.  - Set and communicate daily mobility goal to care team and patient/family/caregiver.   - Collaborate with rehabilitation services on mobility goals if consulted  - Perform Range of Motion x times a day.  - Reposition patient every x hours.  - Dangle patient x times a day  - Stand patient x times a day  - Ambulate patient x times a day  - Out of bed to chair x times a day   - Out of bed for meals x times a day  - Out of bed for toileting  - Record patient progress and toleration of activity level   Outcome: Progressing     Problem: DISCHARGE PLANNING  Goal: Discharge to home or other facility with appropriate resources  Description: INTERVENTIONS:  - Identify barriers to discharge w/patient and caregiver  - Arrange for needed discharge resources and transportation as appropriate  - Identify discharge learning needs (meds, wound care, etc.)  - Arrange for interpretive services to assist at discharge as needed  - Refer to Case Management Department for coordinating discharge planning if the patient needs post-hospital services based on physician/advanced practitioner order or complex needs related to functional status, cognitive ability, or social support system  Outcome: Progressing     Problem: Knowledge Deficit  Goal: Patient/family/caregiver demonstrates understanding of disease process, treatment plan, medications, and discharge instructions  Description: Complete learning assessment and assess knowledge base.  Interventions:  - Provide teaching at level of understanding  - Provide teaching via preferred learning methods  Outcome: Progressing     Problem: Nutrition/Hydration-ADULT  Goal: Nutrient/Hydration intake appropriate for improving, restoring or maintaining nutritional needs  Description: Monitor and assess patient's nutrition/hydration status for malnutrition. Collaborate with interdisciplinary team and initiate plan  and interventions as ordered.  Monitor patient's weight and dietary intake as ordered or per policy. Utilize nutrition screening tool and intervene as necessary. Determine patient's food preferences and provide high-protein, high-caloric foods as appropriate.     INTERVENTIONS:  - Monitor oral intake, urinary output, labs, and treatment plans  - Assess nutrition and hydration status and recommend course of action  - Evaluate amount of meals eaten  - Assist patient with eating if necessary   - Allow adequate time for meals  - Recommend/ encourage appropriate diets, oral nutritional supplements, and vitamin/mineral supplements  - Order, calculate, and assess calorie counts as needed  - Recommend, monitor, and adjust tube feedings and TPN/PPN based on assessed needs  - Assess need for intravenous fluids  - Provide specific nutrition/hydration education as appropriate  - Include patient/family/caregiver in decisions related to nutrition  Outcome: Progressing

## 2024-12-02 NOTE — ASSESSMENT & PLAN NOTE
Wt Readings from Last 3 Encounters:   12/02/24 52.7 kg (116 lb 2.9 oz)     Presenting with dyspnea for few hours PTA to the ED w/ worsening leg swelling over the last few weeks  History of diastolic heart failure-maintained on Lasix 20 Mg daily, spironolactone 25 Mg daily, and Farxiga 10 Mg daily  CT chest consistent with pulmonary congestion and BNP 2251  Given Lasix 20 Mg IV x 1 in the ED with 1 L urine output so far-will change to Lasix 40 Mg IV twice daily  Obtain updated echo; EF of 50% grade 2 diastolic dysfunction  Documented 4.7 L urine output in past 24 hours; with no lower extremity edema and improvement of respiration;   Lasix was reduced from 40 twice daily to daily on 11/30 due to uptrending creatinine.  Continue IV Lasix 40 mg daily for now.  Discussed with cardiology they recommended switch to torsemide 20 mg at discharge with outpatient follow-up  Continue to hold Aldactone and Jardiance.  Patient in net negative balance of 650 cc in last 24 hours, continue to monitor ins and outs and daily weights.

## 2024-12-03 VITALS
DIASTOLIC BLOOD PRESSURE: 97 MMHG | HEIGHT: 63 IN | RESPIRATION RATE: 18 BRPM | SYSTOLIC BLOOD PRESSURE: 118 MMHG | WEIGHT: 118 LBS | TEMPERATURE: 98.4 F | BODY MASS INDEX: 20.91 KG/M2 | OXYGEN SATURATION: 98 % | HEART RATE: 54 BPM

## 2024-12-03 LAB
ANION GAP SERPL CALCULATED.3IONS-SCNC: 9 MMOL/L (ref 4–13)
BASOPHILS # BLD AUTO: 0.07 THOUSANDS/ΜL (ref 0–0.1)
BASOPHILS NFR BLD AUTO: 1 % (ref 0–1)
BUN SERPL-MCNC: 37 MG/DL (ref 5–25)
CALCIUM SERPL-MCNC: 9.3 MG/DL (ref 8.4–10.2)
CHLORIDE SERPL-SCNC: 98 MMOL/L (ref 96–108)
CO2 SERPL-SCNC: 27 MMOL/L (ref 21–32)
CREAT SERPL-MCNC: 1.4 MG/DL (ref 0.6–1.3)
EOSINOPHIL # BLD AUTO: 0.37 THOUSAND/ΜL (ref 0–0.61)
EOSINOPHIL NFR BLD AUTO: 4 % (ref 0–6)
ERYTHROCYTE [DISTWIDTH] IN BLOOD BY AUTOMATED COUNT: 16.1 % (ref 11.6–15.1)
GFR SERPL CREATININE-BSD FRML MDRD: 38 ML/MIN/1.73SQ M
GLUCOSE SERPL-MCNC: 84 MG/DL (ref 65–140)
HCT VFR BLD AUTO: 45.6 % (ref 34.8–46.1)
HGB BLD-MCNC: 13 G/DL (ref 11.5–15.4)
IMM GRANULOCYTES # BLD AUTO: 0.02 THOUSAND/UL (ref 0–0.2)
IMM GRANULOCYTES NFR BLD AUTO: 0 % (ref 0–2)
LYMPHOCYTES # BLD AUTO: 2.62 THOUSANDS/ΜL (ref 0.6–4.47)
LYMPHOCYTES NFR BLD AUTO: 31 % (ref 14–44)
MCH RBC QN AUTO: 24.7 PG (ref 26.8–34.3)
MCHC RBC AUTO-ENTMCNC: 28.5 G/DL (ref 31.4–37.4)
MCV RBC AUTO: 87 FL (ref 82–98)
MONOCYTES # BLD AUTO: 1.02 THOUSAND/ΜL (ref 0.17–1.22)
MONOCYTES NFR BLD AUTO: 12 % (ref 4–12)
NEUTROPHILS # BLD AUTO: 4.41 THOUSANDS/ΜL (ref 1.85–7.62)
NEUTS SEG NFR BLD AUTO: 52 % (ref 43–75)
NRBC BLD AUTO-RTO: 0 /100 WBCS
PLATELET # BLD AUTO: 274 THOUSANDS/UL (ref 149–390)
PMV BLD AUTO: 12.2 FL (ref 8.9–12.7)
POTASSIUM SERPL-SCNC: 4.9 MMOL/L (ref 3.5–5.3)
RBC # BLD AUTO: 5.26 MILLION/UL (ref 3.81–5.12)
SODIUM SERPL-SCNC: 134 MMOL/L (ref 135–147)
WBC # BLD AUTO: 8.51 THOUSAND/UL (ref 4.31–10.16)

## 2024-12-03 PROCEDURE — 80048 BASIC METABOLIC PNL TOTAL CA: CPT | Performed by: INTERNAL MEDICINE

## 2024-12-03 PROCEDURE — 99239 HOSP IP/OBS DSCHRG MGMT >30: CPT | Performed by: INTERNAL MEDICINE

## 2024-12-03 PROCEDURE — 85025 COMPLETE CBC W/AUTO DIFF WBC: CPT | Performed by: INTERNAL MEDICINE

## 2024-12-03 RX ORDER — TORSEMIDE 20 MG/1
20 TABLET ORAL DAILY
Qty: 30 TABLET | Refills: 0 | Status: SHIPPED | OUTPATIENT
Start: 2024-12-03

## 2024-12-03 RX ORDER — LISINOPRIL 20 MG/1
20 TABLET ORAL DAILY
Qty: 60 TABLET | Refills: 0 | Status: SHIPPED | OUTPATIENT
Start: 2024-12-03

## 2024-12-03 RX ADMIN — LORATADINE 10 MG: 10 TABLET ORAL at 08:13

## 2024-12-03 RX ADMIN — PANTOPRAZOLE SODIUM 40 MG: 40 TABLET, DELAYED RELEASE ORAL at 05:52

## 2024-12-03 RX ADMIN — ATORVASTATIN CALCIUM 10 MG: 10 TABLET, FILM COATED ORAL at 08:08

## 2024-12-03 RX ADMIN — BRINZOLAMIDE/BRIMONIDINE TARTRATE 1 DROP: 10; 2 SUSPENSION/ DROPS OPHTHALMIC at 09:22

## 2024-12-03 RX ADMIN — APIXABAN 5 MG: 5 TABLET, FILM COATED ORAL at 08:09

## 2024-12-03 RX ADMIN — GABAPENTIN 600 MG: 300 CAPSULE ORAL at 08:08

## 2024-12-03 RX ADMIN — HYDRALAZINE HYDROCHLORIDE 25 MG: 25 TABLET ORAL at 08:11

## 2024-12-03 RX ADMIN — KETOROLAC TROMETHAMINE 1 DROP: 5 SOLUTION OPHTHALMIC at 08:07

## 2024-12-03 RX ADMIN — UMECLIDINIUM 1 PUFF: 62.5 AEROSOL, POWDER ORAL at 09:22

## 2024-12-03 RX ADMIN — ASPIRIN 81 MG CHEWABLE TABLET 81 MG: 81 TABLET CHEWABLE at 08:08

## 2024-12-03 NOTE — ASSESSMENT & PLAN NOTE
Wt Readings from Last 3 Encounters:   12/03/24 53.5 kg (118 lb)     Presenting with dyspnea for few hours PTA to the ED w/ worsening leg swelling over the last few weeks  History of diastolic heart failure-maintained on Lasix 20 Mg daily, spironolactone 25 Mg daily, and Farxiga 10 Mg daily  CT chest consistent with pulmonary congestion and BNP 2251  Given Lasix 20 Mg IV x 1 in the ED with 1 L urine output so far-will change to Lasix 40 Mg IV twice daily  Obtain updated echo; EF of 50% grade 2 diastolic dysfunction  Documented 4.7 L urine output in past 24 hours; with no lower extremity edema and improvement of respiration;   Lasix was reduced from 40 twice daily to daily on 11/30 due to uptrending creatinine.  Continue IV Lasix 40 mg daily for now.  Discussed with cardiology they recommended switch to torsemide 20 mg at discharge with outpatient follow-up  Patient will be discharged on torsemide 20 mg daily.  Patient is saturating well on room air.  Patient is hemodynamically stable and cleared by cardiology for discharge with outpatient follow-up

## 2024-12-03 NOTE — PLAN OF CARE
Problem: Potential for Falls  Goal: Patient will remain free of falls  Description: INTERVENTIONS:  - Educate patient/family on patient safety including physical limitations  - Instruct patient to call for assistance with activity   - Consult OT/PT to assist with strengthening/mobility   - Keep Call bell within reach  - Keep bed low and locked with side rails adjusted as appropriate  - Keep care items and personal belongings within reach  - Initiate and maintain comfort rounds  - Make Fall Risk Sign visible to staff  Problem: SAFETY ADULT  Goal: Maintain or return to baseline ADL function  Description: INTERVENTIONS:  -  Assess patient's ability to carry out ADLs; assess patient's baseline for ADL function and identify physical deficits which impact ability to perform ADLs (bathing, care of mouth/teeth, toileting, grooming, dressing, etc.)  - Assess/evaluate cause of self-care deficits   - Assess range of motion  - Assess patient's mobility; develop plan if impaired  - Assess patient's need for assistive devices and provide as appropriate  - Encourage maximum independence but intervene and supervise when necessary  - Involve family in performance of ADLs  - Assess for home care needs following discharge   - Consider OT consult to assist with ADL evaluation and planning for discharge  - Provide patient education as appropriate  Outcome: Progressing     - Apply yellow socks and bracelet for high fall risk patients  - Consider moving patient to room near nurses station  Outcome: Progressing     Problem: PAIN - ADULT  Goal: Verbalizes/displays adequate comfort level or baseline comfort level  Description: Interventions:  - Encourage patient to monitor pain and request assistance  - Assess pain using appropriate pain scale  - Administer analgesics based on type and severity of pain and evaluate response  - Implement non-pharmacological measures as appropriate and evaluate response  - Consider cultural and social  influences on pain and pain management  - Notify physician/advanced practitioner if interventions unsuccessful or patient reports new pain  Outcome: Progressing

## 2024-12-03 NOTE — CASE MANAGEMENT
Case Management Discharge Planning Note    Patient name Joaquina Jorge  Location /-01 MRN [id number]  : 1955 Date 12/3/2024       Current Admission Date: 2024  Current Admission Diagnosis:Acute on chronic diastolic heart failure (HCC)   Patient Active Problem List    Diagnosis Date Noted Date Diagnosed    Benign essential HTN 2024     Acute on chronic diastolic heart failure (HCC) 2024     COPD with emphysema (HCC) 2024     Coronary artery disease 2024     H/O thoracic outlet syndrome 2024     LYNDA (obstructive sleep apnea) 2024     Paroxysmal atrial fibrillation (HCC) 2024     Pulmonary embolism (HCC) 2024     Elevated troponin 2024     Leukocytosis 2024     Hypertensive emergency 2024     Tobacco abuse 2024       LOS (days): 4  Geometric Mean LOS (GMLOS) (days): 3.9  Days to GMLOS:-0.4     OBJECTIVE:  Risk of Unplanned Readmission Score: 15.51         Current admission status: Inpatient   Preferred Pharmacy:   [id number] [place] [address]  Phone: [phone] Fax: [phone]    Primary Care Provider: Johnson Auguste    Primary Insurance: MEDICARE  Secondary Insurance:     DISCHARGE DETAILS:                                                                                        IMM Given (Date):: 24  IMM Given to:: Patient   IMM reviewed with patient, patient agrees with discharge determination.

## 2024-12-03 NOTE — DISCHARGE SUMMARY
Discharge Summary - Hospitalist   Name: Joaquina Jorge 69 y.o. female I MRN: [id number]  Unit/Bed#: -01 I Date of Admission: 11/28/2024   Date of Service: 12/3/2024 I Hospital Day: 4     Assessment & Plan  Acute on chronic diastolic heart failure (HCC)  Wt Readings from Last 3 Encounters:   12/03/24 53.5 kg (118 lb)     Presenting with dyspnea for few hours PTA to the ED w/ worsening leg swelling over the last few weeks  History of diastolic heart failure-maintained on Lasix 20 Mg daily, spironolactone 25 Mg daily, and Farxiga 10 Mg daily  CT chest consistent with pulmonary congestion and BNP 2251  Given Lasix 20 Mg IV x 1 in the ED with 1 L urine output so far-will change to Lasix 40 Mg IV twice daily  Obtain updated echo; EF of 50% grade 2 diastolic dysfunction  Documented 4.7 L urine output in past 24 hours; with no lower extremity edema and improvement of respiration;   Lasix was reduced from 40 twice daily to daily on 11/30 due to uptrending creatinine.  Continue IV Lasix 40 mg daily for now.  Discussed with cardiology they recommended switch to torsemide 20 mg at discharge with outpatient follow-up  Patient will be discharged on torsemide 20 mg daily.  Patient is saturating well on room air.  Patient is hemodynamically stable and cleared by cardiology for discharge with outpatient follow-up  Elevated troponin  HS 57/164/151  Suspect elevated troponin secondary to acute on chronic heart failure, as well as hypertensive emergency  Echocardiogram with LVEF of 55%.  Cardiology consulted due to concurrent acute heart failure  Hypertensive emergency  BP markedly elevated in the ED at 229/109  Given NTG without significant improvement, BP did improve s/p hydralazine  Home regimen: Hydralazine 25 Mg daily, lisinopril 20 Mg twice daily, Bystolic 5 Mg daily, spironolactone 25 Mg daily.  Lisinopril and Aldactone was held yesterday due to up trending creatinine.  Creatinine improved to 1.2 this morning, will  resume lisinopril at half a dose, as she was taking 20 mg twice daily, will start on 20 mg daily.  Continue to hold Aldactone for now.  On hydralazine 25 mg daily  Leukocytosis  WBC 11.1 6K on admit  No signs of acute bacterial infection at this time  Resolved.  COPD with emphysema (HCC)  Home regimen: Incruse daily  Patient is without active wheezing on admit  Continue home Incruse for now as she has good inspiratory effort on exam  Low threshold to change to nebulizers if inspiratory effort changes at all  Paroxysmal atrial fibrillation (HCC)  RC: Nebivolol 5 Mg daily  AC: Eliquis 5 Mg twice daily  Continue home medications  Pulmonary embolism (HCC)  Continue home Eliquis 5 Mg twice daily  History of IVC filter placement in the past  Coronary artery disease  History of nonobstructive CAD  Of note, she did experience complete occlusion of right SFA during coronary angiogram requiring right femoral endarterectomy in 2014  Continue home aspirin, statin, lisinopril, and beta-blocker  LYNDA (obstructive sleep apnea)  Previously wore CPAP but this was stopped secondary to worsening glaucoma  NC HS PRN  Tobacco abuse  Smokes approximately 5 cigarettes daily  NRT while inpatient  Benign essential HTN  As documented in hypertensive emergency.   Hospital Course:     Joaquina Jorge is a 69 y.o. female patient who originally presented to the hospital on   Admission Orders (From admission, onward)       Ordered        11/29/24 0353  INPATIENT ADMISSION  Once                         due to shortness of breath.  Patient was admitted with acute on chronic diastolic heart failure and was started on IV Lasix twice daily.  Patient was seen by cardiology and was diuresed.  Patient creatinine came back to baseline.  Patient is cleared by cardiology for discharge with torsemide 20 mg daily and outpatient follow-up.  Patient is saturating well on room air and is hemodynamically stable for discharge  On Exam-  Chest-bilateral air entry,  clear to auscultation  Abdomen-soft, nontender  Heart-S1 S2 regular  Extremities-no pedal edema or calf tenderness  Neuro-alert awake oriented x3.  No focal deficits    Please see above list of diagnoses and related plan for additional information.   Follow-up with PCP and cardiology as outpatient    CONSULTING PROVIDERS   IP CONSULT TO NUTRITION SERVICES  IP CONSULT TO CARDIOLOGY    PROCEDURES PERFORMED  * No surgery found *    RADIOLOGY RESULTS  Echo complete  Result Date: 11/29/2024  Narrative:   Left Ventricle: Left ventricular cavity size is normal. Wall thickness is moderately increased. The left ventricular ejection fraction is 55%. Systolic function is normal. Wall motion is normal. Diastolic function is moderately abnormal, consistent with grade II (pseudonormal) relaxation.   Left Atrium: The atrium is moderately dilated.   Right Atrium: The atrium is mildly dilated.   Mitral Valve: There is mild regurgitation.   Tricuspid Valve: There is mild regurgitation.   Pulmonic Valve: There is mild regurgitation.     X-ray chest 1 view portable  Result Date: 11/29/2024  Narrative: XR CHEST PORTABLE INDICATION: CHF. COMPARISON: None FINDINGS: There is mild to moderate CHF with prominence of the pulmonary vasculature and interstitial markings. No pneumothorax or pleural effusion. Heart size is not well evaluated on this single portable AP view. Bones are unremarkable for age.     Impression: Mild to moderate CHF. Clinical service was aware of the findings at the time of dictation. Workstation performed: [id number]     CTA chest pe study  Result Date: 11/29/2024  Narrative: CTA - CHEST WITH IV CONTRAST - PULMONARY ANGIOGRAM INDICATION: sob. COMPARISON: None. TECHNIQUE: CTA examination of the chest was performed using angiographic technique according to a protocol specifically tailored to evaluate for pulmonary embolism. Multiplanar 2D reformatted images were created from the source data. In addition, coronal  3D  MIP postprocessing was performed on the acquisition scanner. Radiation dose length product (DLP) for this visit: 257.6 mGy-cm . This examination, like all CT scans performed in the [facility], was performed utilizing techniques to minimize radiation dose exposure, including the use of iterative reconstruction and automated exposure control. IV Contrast: 85 mL of iohexol (OMNIPAQUE) FINDINGS: PULMONARY ARTERIAL TREE:  No pulmonary embolus. LUNGS: Diffuse interlobar septal thickening. There is no tracheal or endobronchial lesion. PLEURA: Small right pleural effusion HEART/GREAT VESSELS: Heart is unremarkable for patient's age. No thoracic aortic aneurysm. MEDIASTINUM AND SOBEIDA: Unremarkable. CHEST WALL AND LOWER NECK: Unremarkable. VISUALIZED STRUCTURES IN THE UPPER ABDOMEN: Unremarkable. OSSEOUS STRUCTURES: No acute fracture or destructive osseous lesion.     Impression: No evidence of pulmonary embolus. CHF. Small right pleural effusion Workstation performed: [id number]       LABS  Results from last 7 days   Lab Units 12/03/24  0352 12/02/24  0548 12/01/24 0353 11/30/24 0254 11/29/24  0542 11/28/24 2150 11/28/24  2147   WBC Thousand/uL 8.51 7.49 8.69 9.09 12.64* 11.16*  --    HEMOGLOBIN g/dL 13.0 12.5 12.5 12.0 11.8 11.9  --    I STAT HEMOGLOBIN g/dl  --   --   --   --   --   --  13.6   HEMATOCRIT % 45.6 41.9 42.6 39.3 39.1 39.8  --    HEMATOCRIT, ISTAT %  --   --   --   --   --   --  40   MCV fL 87 85 86 84 85 86  --    PLATELETS Thousands/uL 274 242 252 227 229 233  --    INR   --   --   --   --   --  1.32*  --      Results from last 7 days   Lab Units 12/03/24  0352 12/02/24  1435 12/02/24  0548 12/01/24 0353 11/30/24  0254 11/29/24  0915 11/28/24 2150 11/28/24  2147   SODIUM mmol/L 134* 139 138 138 139 142 143  --    POTASSIUM mmol/L 4.9 4.4 5.6* 5.1 3.3* 3.0* 3.7  --    CHLORIDE mmol/L 98 99 106 105 101 105 109*  --    CO2 mmol/L 27 32 27 28 30 28 24  --    CO2, I-STAT mmol/L  --   --   --   " --   --   --   --  24   BUN mg/dL 37* 27* 30* 30* 23 14 17  --    CREATININE mg/dL 1.40* 1.38* 1.21 1.23 1.35* 1.09 1.07  --    CALCIUM mg/dL 9.3 10.1 9.0 8.1* 8.2* 9.1 9.3  --    ALBUMIN g/dL  --   --   --   --   --   --  4.1  --    TOTAL BILIRUBIN mg/dL  --   --   --   --   --   --  0.73  --    ALK PHOS U/L  --   --   --   --   --   --  97  --    ALT U/L  --   --   --   --   --   --  48  --    AST U/L  --   --   --   --   --   --  59*  --    EGFR ml/min/1.73sq m 38 39 45 44 40 51 53  --    GLUCOSE RANDOM mg/dL 84 71 95 92 84 142* 99  --      Results from last 7 days   Lab Units 11/29/24  0150 11/28/24  2355 11/28/24  2150   HS TNI 0HR ng/L  --   --  57*   HS TNI 2HR ng/L  --  164*  --    HS TNI 4HR ng/L 151*  --   --           Results from last 7 days   Lab Units 11/28/24  2150   D-DIMER QUANTITATIVE ug/ml FEU 0.99*         Results from last 7 days   Lab Units 11/29/24  0542   HEMOGLOBIN A1C % 6.2*         Results from last 7 days   Lab Units 11/28/24  2150   PROCALCITONIN ng/ml 0.06           Cultures:         Invalid input(s): \"URIBILINOGEN\"              Condition at Discharge:  good      Discharge instructions/Information to patient and family:   See after visit summary for information provided to patient and family.      Provisions for Follow-Up Care:  See after visit summary for information related to follow-up care and any pertinent home health orders.      Disposition:     Home       Discharge Statement:  I spent 40 minutes discharging the patient. This time was spent on the day of discharge. I had direct contact with the patient on the day of discharge. Greater than 50% of the total time was spent examining patient, answering all patient questions, arranging and discussing plan of care with patient as well as directly providing post-discharge instructions.  Additional time then spent on discharge activities.    Discharge Medications:  See after visit summary for reconciled discharge medications provided " to patient and family.      ** Please Note: This note has been constructed using a voice recognition system **

## 2024-12-03 NOTE — PLAN OF CARE
Problem: PAIN - ADULT  Goal: Verbalizes/displays adequate comfort level or baseline comfort level  Description: Interventions:  - Encourage patient to monitor pain and request assistance  - Assess pain using appropriate pain scale  - Administer analgesics based on type and severity of pain and evaluate response  - Implement non-pharmacological measures as appropriate and evaluate response  - Consider cultural and social influences on pain and pain management  - Notify physician/advanced practitioner if interventions unsuccessful or patient reports new pain  Outcome: Progressing     Problem: INFECTION - ADULT  Goal: Absence or prevention of progression during hospitalization  Description: INTERVENTIONS:  - Assess and monitor for signs and symptoms of infection  - Monitor lab/diagnostic results  - Monitor all insertion sites, i.e. indwelling lines, tubes, and drains  - Monitor endotracheal if appropriate and nasal secretions for changes in amount and color  - [place] appropriate cooling/warming therapies per order  - Administer medications as ordered  - Instruct and encourage patient and family to use good hand hygiene technique  - Identify and instruct in appropriate isolation precautions for identified infection/condition  Outcome: Progressing  Goal: Absence of fever/infection during neutropenic period  Description: INTERVENTIONS:  - Monitor WBC    Outcome: Progressing

## 2024-12-04 ENCOUNTER — DOCTOR'S OFFICE (OUTPATIENT)
Facility: LOCATION | Age: 69
Setting detail: OPHTHALMOLOGY
End: 2024-12-04
Payer: MEDICARE

## 2024-12-04 ENCOUNTER — RX ONLY (RX ONLY)
Age: 69
End: 2024-12-04

## 2024-12-04 ENCOUNTER — PATIENT OUTREACH (OUTPATIENT)
Dept: CASE MANAGEMENT | Facility: OTHER | Age: 69
End: 2024-12-04

## 2024-12-04 DIAGNOSIS — Z96.1: ICD-10-CM

## 2024-12-04 DIAGNOSIS — H35.353: ICD-10-CM

## 2024-12-04 DIAGNOSIS — Z94.7: ICD-10-CM

## 2024-12-04 DIAGNOSIS — H40.1132: ICD-10-CM

## 2024-12-04 DIAGNOSIS — H18.12: ICD-10-CM

## 2024-12-04 DIAGNOSIS — I50.33 ACUTE ON CHRONIC DIASTOLIC HEART FAILURE (HCC): Primary | ICD-10-CM

## 2024-12-04 DIAGNOSIS — H40.041: ICD-10-CM

## 2024-12-04 PROCEDURE — 99213 OFFICE O/P EST LOW 20 MIN: CPT | Performed by: OPHTHALMOLOGY

## 2024-12-04 ASSESSMENT — CONFRONTATIONAL VISUAL FIELD TEST (CVF)
OS_FINDINGS: CONSTRICTION
OD_FINDINGS: FULL

## 2024-12-04 NOTE — PROGRESS NOTES
Outpatient Care Management Note:  OPCM referral identified via HF  discharge report.  Chart review completed.  Patient was admitted to [facility] on 11/28/24 for acute on chronic diastolic HF.  Patient was diuresed with IV diuretics. Patient was cleared to discharge to home  on 12/03/24  with recommendation to follow up with PCP and Cardiology.      PMH includes:  Diastolic HF, COPD, paroxysmal afib, LYNDA, CAD, current tobacco smoker    Future Appointments:  None found in EPIC    Discharge follow up call made.  Spoke with patient.  CM introduced self with explanation of purpose of phone call.   Is this a good time for you to talk?   No.   Patient informs me she is just returning from an eye doctor's appointment.  Patient states she is tired and hungry.  I offered to call patient back tomorrow and patient agreeable.  Patient also asks that I call her on her home phone number (not listed).  Patient provided me with her home phone number which was added to patient's basic demographic information and updated.  Outreach reminder set.  closure date reminder also set.

## 2024-12-05 ENCOUNTER — PATIENT OUTREACH (OUTPATIENT)
Dept: CASE MANAGEMENT | Facility: OTHER | Age: 69
End: 2024-12-05

## 2024-12-05 NOTE — PROGRESS NOTES
Outpatient Care Management Note:    Second telephone outreach attempt made.     No answer.  Voice mail reached was generic with no identifying factors.  Left voicemail message with general contact information with name, title, call back phone number office hours when I am available and message encouraging return call to this CM.    Unable to leave message as there was no voice mailbox set up.   Mailbox is full and unable to leave message.  Will attempt second outreach within 1 week.  IB message reminder has been set.      Is this a good time for you to talk?   No.  Patient states she is still sleeping and asks if I  her my last phone call for the day.  Patient agreeable that I call her at around 4 PM today.     ADDENDUM:  Made phone call to patient.  Patient confirms she resides in [place] and has a cardiologist through [place].  Patient verbalizes knowledge and understanding of med changes made during hospitalization and states she is taking her medications as prescribed.  Patient states she has been using the scale given to her while in the hospital and has been weighing self daily.  Reports today's weight as 117 lbs.  She has contacted her PCP and has an appointment on Monday.  She admits she has not called her cardiologist yet but plans to do so tomorrow.  Patient has no other needs or concerns at this time    Safe transitions episode closed as patient lives outside of catchment area.  I have removed myself from care team.

## 2024-12-09 ASSESSMENT — VISUAL ACUITY
OS_BCVA: 20/40+2
OD_BCVA: HM

## 2025-01-06 ENCOUNTER — DOCTOR'S OFFICE (OUTPATIENT)
Facility: LOCATION | Age: 70
Setting detail: OPHTHALMOLOGY
End: 2025-01-06
Payer: MEDICARE

## 2025-01-06 ENCOUNTER — RX ONLY (RX ONLY)
Age: 70
End: 2025-01-06

## 2025-01-06 DIAGNOSIS — H40.1132: ICD-10-CM

## 2025-01-06 DIAGNOSIS — H02.012: ICD-10-CM

## 2025-01-06 DIAGNOSIS — H02.011: ICD-10-CM

## 2025-01-06 DIAGNOSIS — H18.12: ICD-10-CM

## 2025-01-06 PROCEDURE — 99213 OFFICE O/P EST LOW 20 MIN: CPT | Mod: 25 | Performed by: OPHTHALMOLOGY

## 2025-01-06 PROCEDURE — 67820 REVISE EYELASHES: CPT | Mod: E3,E4 | Performed by: OPHTHALMOLOGY

## 2025-01-06 ASSESSMENT — LID EXAM ASSESSMENTS: OD_TRICHIASIS: RLL RUL

## 2025-01-06 ASSESSMENT — VISUAL ACUITY
OS_BCVA: 20/40-1
OD_BCVA: HM

## 2025-01-15 ENCOUNTER — DOCTOR'S OFFICE (OUTPATIENT)
Facility: LOCATION | Age: 70
Setting detail: OPHTHALMOLOGY
End: 2025-01-15
Payer: MEDICARE

## 2025-01-15 DIAGNOSIS — H02.011: ICD-10-CM

## 2025-01-15 DIAGNOSIS — H18.12: ICD-10-CM

## 2025-01-15 DIAGNOSIS — H40.1132: ICD-10-CM

## 2025-01-15 PROCEDURE — 67820 REVISE EYELASHES: CPT | Mod: RT | Performed by: OPHTHALMOLOGY

## 2025-01-15 PROCEDURE — 99212 OFFICE O/P EST SF 10 MIN: CPT | Mod: 25 | Performed by: OPHTHALMOLOGY

## 2025-01-15 ASSESSMENT — VISUAL ACUITY
OD_BCVA: HM
OS_BCVA: 20/40-2

## 2025-01-15 ASSESSMENT — LID EXAM ASSESSMENTS: OD_TRICHIASIS: RUL 1+

## 2025-01-27 ENCOUNTER — DOCTOR'S OFFICE (OUTPATIENT)
Facility: LOCATION | Age: 70
Setting detail: OPHTHALMOLOGY
End: 2025-01-27
Payer: MEDICARE

## 2025-01-27 DIAGNOSIS — H18.12: ICD-10-CM

## 2025-01-27 DIAGNOSIS — H40.1132: ICD-10-CM

## 2025-01-27 PROBLEM — H02.011 TRICHIASIS; RIGHT UPPER LID,: Status: RESOLVED | Noted: 2025-01-06 | Resolved: 2025-01-27

## 2025-01-27 PROCEDURE — 99212 OFFICE O/P EST SF 10 MIN: CPT | Performed by: OPTOMETRIST

## 2025-01-27 ASSESSMENT — VISUAL ACUITY
OS_BCVA: 20/50-1
OD_BCVA: LP

## 2025-01-29 ENCOUNTER — DOCTOR'S OFFICE (OUTPATIENT)
Facility: LOCATION | Age: 70
Setting detail: OPHTHALMOLOGY
End: 2025-01-29
Payer: MEDICARE

## 2025-01-29 DIAGNOSIS — H18.12: ICD-10-CM

## 2025-01-29 DIAGNOSIS — Z94.7: ICD-10-CM

## 2025-01-29 DIAGNOSIS — H40.1132: ICD-10-CM

## 2025-01-29 PROCEDURE — 92020 GONIOSCOPY: CPT | Performed by: OPHTHALMOLOGY

## 2025-01-29 PROCEDURE — 99213 OFFICE O/P EST LOW 20 MIN: CPT | Performed by: OPHTHALMOLOGY

## 2025-01-29 ASSESSMENT — VISUAL ACUITY
OD_BCVA: LP
OS_BCVA: 20/50

## 2025-01-29 ASSESSMENT — CONFRONTATIONAL VISUAL FIELD TEST (CVF)
OS_FINDINGS: CONSTRICTION
OD_FINDINGS: FULL

## 2025-02-12 ENCOUNTER — DOCTOR'S OFFICE (OUTPATIENT)
Facility: LOCATION | Age: 70
Setting detail: OPHTHALMOLOGY
End: 2025-02-12
Payer: MEDICARE

## 2025-02-12 ENCOUNTER — RX ONLY (RX ONLY)
Age: 70
End: 2025-02-12

## 2025-02-12 DIAGNOSIS — H40.1132: ICD-10-CM

## 2025-02-12 PROCEDURE — 92020 GONIOSCOPY: CPT | Performed by: OPHTHALMOLOGY

## 2025-02-12 PROCEDURE — 99213 OFFICE O/P EST LOW 20 MIN: CPT | Performed by: OPHTHALMOLOGY

## 2025-02-12 ASSESSMENT — VISUAL ACUITY
OD_BCVA: LP
OS_BCVA: 20/40-1

## 2025-03-05 ENCOUNTER — DOCTOR'S OFFICE (OUTPATIENT)
Facility: LOCATION | Age: 70
Setting detail: OPHTHALMOLOGY
End: 2025-03-05
Payer: MEDICARE

## 2025-03-05 DIAGNOSIS — H40.1132: ICD-10-CM

## 2025-03-05 PROCEDURE — 92012 INTRM OPH EXAM EST PATIENT: CPT | Performed by: OPHTHALMOLOGY

## 2025-03-05 ASSESSMENT — VISUAL ACUITY
OS_BCVA: 20/40+1
OD_BCVA: LP

## 2025-03-26 ENCOUNTER — RX ONLY (RX ONLY)
Age: 70
End: 2025-03-26

## 2025-03-26 ENCOUNTER — DOCTOR'S OFFICE (OUTPATIENT)
Facility: LOCATION | Age: 70
Setting detail: OPHTHALMOLOGY
End: 2025-03-26
Payer: MEDICARE

## 2025-03-26 DIAGNOSIS — H40.1132: ICD-10-CM

## 2025-03-26 PROCEDURE — 92020 GONIOSCOPY: CPT | Performed by: OPHTHALMOLOGY

## 2025-03-26 PROCEDURE — 99213 OFFICE O/P EST LOW 20 MIN: CPT | Performed by: OPHTHALMOLOGY

## 2025-03-26 ASSESSMENT — CORNEAL EDEMA - FOLDS/STRIAE: OS_FOLDSSTRIAE: 1+

## 2025-03-26 ASSESSMENT — VISUAL ACUITY
OS_BCVA: 20/40+1
OD_BCVA: LP

## 2025-03-26 ASSESSMENT — CONFRONTATIONAL VISUAL FIELD TEST (CVF)
OD_FINDINGS: FULL
OS_FINDINGS: FULL

## 2025-04-02 ENCOUNTER — DOCTOR'S OFFICE (OUTPATIENT)
Facility: LOCATION | Age: 70
Setting detail: OPHTHALMOLOGY
End: 2025-04-02
Payer: MEDICARE

## 2025-04-02 DIAGNOSIS — H40.1132: ICD-10-CM

## 2025-04-02 PROCEDURE — 99212 OFFICE O/P EST SF 10 MIN: CPT | Performed by: OPHTHALMOLOGY

## 2025-04-02 ASSESSMENT — CORNEAL EDEMA - FOLDS/STRIAE: OS_FOLDSSTRIAE: 1+

## 2025-04-02 ASSESSMENT — VISUAL ACUITY
OD_BCVA: LP
OS_BCVA: 20/40-2

## 2025-04-09 ENCOUNTER — DOCTOR'S OFFICE (OUTPATIENT)
Facility: LOCATION | Age: 70
Setting detail: OPHTHALMOLOGY
End: 2025-04-09
Payer: MEDICARE

## 2025-04-09 DIAGNOSIS — H20.9: ICD-10-CM

## 2025-04-09 DIAGNOSIS — H40.1132: ICD-10-CM

## 2025-04-09 PROCEDURE — 99213 OFFICE O/P EST LOW 20 MIN: CPT | Performed by: OPHTHALMOLOGY

## 2025-04-09 ASSESSMENT — CORNEAL EDEMA - FOLDS/STRIAE: OS_FOLDSSTRIAE: 1+

## 2025-04-09 ASSESSMENT — VISUAL ACUITY
OD_BCVA: LP
OS_BCVA: 20/40

## 2025-04-23 ENCOUNTER — DOCTOR'S OFFICE (OUTPATIENT)
Facility: LOCATION | Age: 70
Setting detail: OPHTHALMOLOGY
End: 2025-04-23
Payer: MEDICARE

## 2025-04-23 DIAGNOSIS — Z96.1: ICD-10-CM

## 2025-04-23 DIAGNOSIS — H20.9: ICD-10-CM

## 2025-04-23 DIAGNOSIS — Z94.7: ICD-10-CM

## 2025-04-23 DIAGNOSIS — H18.12: ICD-10-CM

## 2025-04-23 DIAGNOSIS — H40.1132: ICD-10-CM

## 2025-04-23 PROCEDURE — 92133 CPTRZD OPH DX IMG PST SGM ON: CPT | Performed by: OPHTHALMOLOGY

## 2025-04-23 PROCEDURE — 92020 GONIOSCOPY: CPT | Performed by: OPHTHALMOLOGY

## 2025-04-23 PROCEDURE — 99213 OFFICE O/P EST LOW 20 MIN: CPT | Performed by: OPHTHALMOLOGY

## 2025-04-23 ASSESSMENT — CONFRONTATIONAL VISUAL FIELD TEST (CVF)
OD_FINDINGS: FULL
OS_FINDINGS: FULL

## 2025-04-23 ASSESSMENT — CORNEAL EDEMA - FOLDS/STRIAE: OS_FOLDSSTRIAE: 1+

## 2025-04-25 ASSESSMENT — VISUAL ACUITY
OS_BCVA: 20/70
OD_BCVA: LP

## 2025-04-30 ENCOUNTER — DOCTOR'S OFFICE (OUTPATIENT)
Facility: LOCATION | Age: 70
Setting detail: OPHTHALMOLOGY
End: 2025-04-30
Payer: MEDICARE

## 2025-04-30 DIAGNOSIS — H18.12: ICD-10-CM

## 2025-04-30 DIAGNOSIS — H40.1132: ICD-10-CM

## 2025-04-30 DIAGNOSIS — Z96.1: ICD-10-CM

## 2025-04-30 DIAGNOSIS — Z94.7: ICD-10-CM

## 2025-04-30 DIAGNOSIS — H20.9: ICD-10-CM

## 2025-04-30 PROCEDURE — 99212 OFFICE O/P EST SF 10 MIN: CPT | Performed by: OPHTHALMOLOGY

## 2025-04-30 ASSESSMENT — CONFRONTATIONAL VISUAL FIELD TEST (CVF)
OS_FINDINGS: FULL
OD_FINDINGS: FULL

## 2025-04-30 ASSESSMENT — VISUAL ACUITY
OS_BCVA: 20/70
OD_BCVA: LP

## 2025-05-07 ENCOUNTER — DOCTOR'S OFFICE (OUTPATIENT)
Facility: LOCATION | Age: 70
Setting detail: OPHTHALMOLOGY
End: 2025-05-07
Payer: MEDICARE

## 2025-05-07 DIAGNOSIS — Z96.1: ICD-10-CM

## 2025-05-07 DIAGNOSIS — Z94.7: ICD-10-CM

## 2025-05-07 DIAGNOSIS — H20.9: ICD-10-CM

## 2025-05-07 DIAGNOSIS — H18.12: ICD-10-CM

## 2025-05-07 DIAGNOSIS — H40.1132: ICD-10-CM

## 2025-05-07 PROCEDURE — 99212 OFFICE O/P EST SF 10 MIN: CPT | Performed by: OPHTHALMOLOGY

## 2025-05-07 ASSESSMENT — CORNEAL EDEMA - FOLDS/STRIAE: OS_FOLDSSTRIAE: 1+

## 2025-05-14 ENCOUNTER — DOCTOR'S OFFICE (OUTPATIENT)
Facility: LOCATION | Age: 70
Setting detail: OPHTHALMOLOGY
End: 2025-05-14
Payer: MEDICARE

## 2025-05-14 DIAGNOSIS — H40.1132: ICD-10-CM

## 2025-05-14 DIAGNOSIS — H18.12: ICD-10-CM

## 2025-05-14 DIAGNOSIS — Z96.1: ICD-10-CM

## 2025-05-14 DIAGNOSIS — H16.223: ICD-10-CM

## 2025-05-14 DIAGNOSIS — Z94.7: ICD-10-CM

## 2025-05-14 DIAGNOSIS — M06.9: ICD-10-CM

## 2025-05-14 DIAGNOSIS — H40.041: ICD-10-CM

## 2025-05-14 DIAGNOSIS — H20.9: ICD-10-CM

## 2025-05-14 PROCEDURE — 68761 CLOSE TEAR DUCT OPENING: CPT | Mod: 50 | Performed by: OPHTHALMOLOGY

## 2025-05-14 PROCEDURE — 92250 FUNDUS PHOTOGRAPHY W/I&R: CPT | Performed by: OPHTHALMOLOGY

## 2025-05-14 PROCEDURE — 99214 OFFICE O/P EST MOD 30 MIN: CPT | Mod: 25 | Performed by: OPHTHALMOLOGY

## 2025-05-14 ASSESSMENT — CORNEAL EDEMA - FOLDS/STRIAE: OS_FOLDSSTRIAE: 1+

## 2025-05-14 ASSESSMENT — VISUAL ACUITY
OD_BCVA: LP
OD_BCVA: LP
OS_BCVA: 20/60-2
OS_BCVA: 20/100

## 2025-05-14 ASSESSMENT — CONFRONTATIONAL VISUAL FIELD TEST (CVF)
OD_FINDINGS: FULL
OS_FINDINGS: FULL

## 2025-05-21 ENCOUNTER — DOCTOR'S OFFICE (OUTPATIENT)
Facility: LOCATION | Age: 70
Setting detail: OPHTHALMOLOGY
End: 2025-05-21
Payer: MEDICARE

## 2025-05-21 ENCOUNTER — RX ONLY (RX ONLY)
Age: 70
End: 2025-05-21

## 2025-05-21 DIAGNOSIS — H40.1132: ICD-10-CM

## 2025-05-21 DIAGNOSIS — H18.12: ICD-10-CM

## 2025-05-21 DIAGNOSIS — H20.9: ICD-10-CM

## 2025-05-21 DIAGNOSIS — H40.041: ICD-10-CM

## 2025-05-21 PROCEDURE — 99213 OFFICE O/P EST LOW 20 MIN: CPT | Mod: 24 | Performed by: OPHTHALMOLOGY

## 2025-05-21 ASSESSMENT — VISUAL ACUITY
OD_BCVA: LP
OS_BCVA: 20/60-1

## 2025-05-21 ASSESSMENT — CORNEAL EDEMA - FOLDS/STRIAE: OS_FOLDSSTRIAE: 1+

## 2025-05-28 ENCOUNTER — DOCTOR'S OFFICE (OUTPATIENT)
Facility: LOCATION | Age: 70
Setting detail: OPHTHALMOLOGY
End: 2025-05-28
Payer: MEDICARE

## 2025-05-28 DIAGNOSIS — H20.9: ICD-10-CM

## 2025-05-28 DIAGNOSIS — H18.12: ICD-10-CM

## 2025-05-28 DIAGNOSIS — H40.041: ICD-10-CM

## 2025-05-28 DIAGNOSIS — H40.1132: ICD-10-CM

## 2025-05-28 PROBLEM — H16.223 KERATOCONJUNCTIVITIS SICCA NOT SPECIFIED AS SJORGRENS; BOTH EYES: Status: ACTIVE | Noted: 2025-05-14

## 2025-05-28 PROBLEM — J01.90 ACUTE SINUSITIS ; BOTH EYES: Status: ACTIVE | Noted: 2025-05-14

## 2025-05-28 PROCEDURE — 99213 OFFICE O/P EST LOW 20 MIN: CPT | Performed by: OPHTHALMOLOGY

## 2025-05-28 ASSESSMENT — CONFRONTATIONAL VISUAL FIELD TEST (CVF)
OD_FINDINGS: FULL
OS_FINDINGS: FULL

## 2025-05-28 ASSESSMENT — CORNEAL EDEMA - FOLDS/STRIAE: OS_FOLDSSTRIAE: 1+

## 2025-05-28 ASSESSMENT — VISUAL ACUITY
OD_BCVA: LP
OS_BCVA: 20/80

## 2025-06-18 ENCOUNTER — DOCTOR'S OFFICE (OUTPATIENT)
Facility: LOCATION | Age: 70
Setting detail: OPHTHALMOLOGY
End: 2025-06-18
Payer: MEDICARE

## 2025-06-18 DIAGNOSIS — H40.041: ICD-10-CM

## 2025-06-18 DIAGNOSIS — H02.011: ICD-10-CM

## 2025-06-18 DIAGNOSIS — H18.12: ICD-10-CM

## 2025-06-18 DIAGNOSIS — H40.1132: ICD-10-CM

## 2025-06-18 PROCEDURE — 67820 REVISE EYELASHES: CPT | Mod: RT | Performed by: OPHTHALMOLOGY

## 2025-06-18 PROCEDURE — 99213 OFFICE O/P EST LOW 20 MIN: CPT | Mod: 25 | Performed by: OPHTHALMOLOGY

## 2025-06-18 ASSESSMENT — VISUAL ACUITY
OD_BCVA: LP
OS_BCVA: 20/40+2

## 2025-06-18 ASSESSMENT — CONFRONTATIONAL VISUAL FIELD TEST (CVF)
OS_FINDINGS: FULL
OD_FINDINGS: FULL

## 2025-06-18 ASSESSMENT — LID EXAM ASSESSMENTS: OD_TRICHIASIS: RUL

## 2025-06-18 ASSESSMENT — CORNEAL EDEMA - FOLDS/STRIAE: OS_FOLDSSTRIAE: 1+

## 2025-06-25 ENCOUNTER — DOCTOR'S OFFICE (OUTPATIENT)
Facility: LOCATION | Age: 70
Setting detail: OPHTHALMOLOGY
End: 2025-06-25
Payer: MEDICARE

## 2025-06-25 DIAGNOSIS — H18.12: ICD-10-CM

## 2025-06-25 DIAGNOSIS — H40.041: ICD-10-CM

## 2025-06-25 DIAGNOSIS — H40.1132: ICD-10-CM

## 2025-06-25 DIAGNOSIS — H02.011: ICD-10-CM

## 2025-06-25 DIAGNOSIS — Z96.1: ICD-10-CM

## 2025-06-25 DIAGNOSIS — Z94.7: ICD-10-CM

## 2025-06-25 PROCEDURE — 99213 OFFICE O/P EST LOW 20 MIN: CPT | Performed by: OPHTHALMOLOGY

## 2025-06-25 ASSESSMENT — CORNEAL EDEMA - FOLDS/STRIAE: OS_FOLDSSTRIAE: 1+

## 2025-06-25 ASSESSMENT — VISUAL ACUITY
OS_BCVA: 20/30-2
OD_BCVA: LP

## 2025-07-02 ENCOUNTER — DOCTOR'S OFFICE (OUTPATIENT)
Facility: LOCATION | Age: 70
Setting detail: OPHTHALMOLOGY
End: 2025-07-02
Payer: MEDICARE

## 2025-07-02 DIAGNOSIS — H40.041: ICD-10-CM

## 2025-07-02 DIAGNOSIS — H40.1132: ICD-10-CM

## 2025-07-02 DIAGNOSIS — H18.12: ICD-10-CM

## 2025-07-02 PROCEDURE — 99213 OFFICE O/P EST LOW 20 MIN: CPT | Performed by: OPHTHALMOLOGY

## 2025-07-02 ASSESSMENT — VISUAL ACUITY
OD_BCVA: LP
OS_BCVA: 20/30-

## 2025-07-09 ENCOUNTER — DOCTOR'S OFFICE (OUTPATIENT)
Facility: LOCATION | Age: 70
Setting detail: OPHTHALMOLOGY
End: 2025-07-09
Payer: MEDICARE

## 2025-07-09 DIAGNOSIS — H40.041: ICD-10-CM

## 2025-07-09 DIAGNOSIS — H18.12: ICD-10-CM

## 2025-07-09 DIAGNOSIS — H40.1132: ICD-10-CM

## 2025-07-09 PROCEDURE — 92083 EXTENDED VISUAL FIELD XM: CPT | Performed by: OPHTHALMOLOGY

## 2025-07-09 PROCEDURE — 99213 OFFICE O/P EST LOW 20 MIN: CPT | Performed by: OPHTHALMOLOGY

## 2025-07-09 ASSESSMENT — VISUAL ACUITY
OS_BCVA: 20/50
OD_BCVA: LP

## 2025-07-09 ASSESSMENT — CONFRONTATIONAL VISUAL FIELD TEST (CVF)
OD_FINDINGS: FULL
OS_FINDINGS: FULL

## 2025-07-09 ASSESSMENT — CORNEAL EDEMA - FOLDS/STRIAE: OS_FOLDSSTRIAE: 1+

## 2025-07-09 ASSESSMENT — LID EXAM ASSESSMENTS: OD_TRICHIASIS: RUL

## 2025-07-16 ENCOUNTER — DOCTOR'S OFFICE (OUTPATIENT)
Facility: LOCATION | Age: 70
Setting detail: OPHTHALMOLOGY
End: 2025-07-16
Payer: MEDICARE

## 2025-07-16 DIAGNOSIS — H40.1132: ICD-10-CM

## 2025-07-16 DIAGNOSIS — H40.041: ICD-10-CM

## 2025-07-16 DIAGNOSIS — H18.12: ICD-10-CM

## 2025-07-16 PROCEDURE — 99213 OFFICE O/P EST LOW 20 MIN: CPT | Performed by: OPHTHALMOLOGY

## 2025-07-16 ASSESSMENT — CONFRONTATIONAL VISUAL FIELD TEST (CVF)
OD_FINDINGS: FULL
OS_FINDINGS: FULL

## 2025-07-16 ASSESSMENT — VISUAL ACUITY
OD_BCVA: LP
OS_BCVA: 20/50

## 2025-07-16 ASSESSMENT — LID EXAM ASSESSMENTS: OD_TRICHIASIS: RUL

## 2025-07-16 ASSESSMENT — CORNEAL EDEMA - FOLDS/STRIAE: OS_FOLDSSTRIAE: 1+

## 2025-07-23 ENCOUNTER — DOCTOR'S OFFICE (OUTPATIENT)
Facility: LOCATION | Age: 70
Setting detail: OPHTHALMOLOGY
End: 2025-07-23
Payer: MEDICARE

## 2025-07-23 DIAGNOSIS — H18.12: ICD-10-CM

## 2025-07-23 DIAGNOSIS — H40.1132: ICD-10-CM

## 2025-07-23 DIAGNOSIS — Z96.1: ICD-10-CM

## 2025-07-23 DIAGNOSIS — Z94.7: ICD-10-CM

## 2025-07-23 DIAGNOSIS — H40.041: ICD-10-CM

## 2025-07-23 PROCEDURE — 99213 OFFICE O/P EST LOW 20 MIN: CPT | Performed by: OPHTHALMOLOGY

## 2025-07-23 ASSESSMENT — VISUAL ACUITY
OS_BCVA: 20/40
OD_BCVA: LP

## 2025-07-23 ASSESSMENT — CORNEAL EDEMA - FOLDS/STRIAE: OS_FOLDSSTRIAE: 1+

## 2025-07-30 ENCOUNTER — DOCTOR'S OFFICE (OUTPATIENT)
Facility: LOCATION | Age: 70
Setting detail: OPHTHALMOLOGY
End: 2025-07-30
Payer: MEDICARE

## 2025-07-30 DIAGNOSIS — H17.9: ICD-10-CM

## 2025-07-30 DIAGNOSIS — H21.01: ICD-10-CM

## 2025-07-30 DIAGNOSIS — H40.1132: ICD-10-CM

## 2025-07-30 DIAGNOSIS — H40.041: ICD-10-CM

## 2025-07-30 PROCEDURE — 92014 COMPRE OPH EXAM EST PT 1/>: CPT | Performed by: OPHTHALMOLOGY

## 2025-07-30 ASSESSMENT — LID EXAM ASSESSMENTS: OD_TRICHIASIS: RUL

## 2025-07-30 ASSESSMENT — CONFRONTATIONAL VISUAL FIELD TEST (CVF)
OS_FINDINGS: FULL
OD_FINDINGS: FULL

## 2025-07-30 ASSESSMENT — VISUAL ACUITY
OS_BCVA: 20/80
OD_BCVA: LP

## 2025-07-30 ASSESSMENT — CORNEAL EDEMA - FOLDS/STRIAE: OS_FOLDSSTRIAE: 1+

## 2025-08-06 ENCOUNTER — DOCTOR'S OFFICE (OUTPATIENT)
Facility: LOCATION | Age: 70
Setting detail: OPHTHALMOLOGY
End: 2025-08-06
Payer: MEDICARE

## 2025-08-06 DIAGNOSIS — H40.1132: ICD-10-CM

## 2025-08-06 DIAGNOSIS — H40.041: ICD-10-CM

## 2025-08-06 DIAGNOSIS — H21.01: ICD-10-CM

## 2025-08-06 DIAGNOSIS — H17.9: ICD-10-CM

## 2025-08-06 PROCEDURE — 99213 OFFICE O/P EST LOW 20 MIN: CPT | Performed by: OPHTHALMOLOGY

## 2025-08-06 ASSESSMENT — CORNEAL EDEMA - FOLDS/STRIAE: OS_FOLDSSTRIAE: 1+

## 2025-08-06 ASSESSMENT — VISUAL ACUITY
OD_BCVA: LP
OS_BCVA: 20/60+1

## 2025-08-20 ENCOUNTER — DOCTOR'S OFFICE (OUTPATIENT)
Facility: LOCATION | Age: 70
Setting detail: OPHTHALMOLOGY
End: 2025-08-20
Payer: MEDICARE

## 2025-08-20 DIAGNOSIS — H40.1132: ICD-10-CM

## 2025-08-20 DIAGNOSIS — H21.01: ICD-10-CM

## 2025-08-20 DIAGNOSIS — H17.9: ICD-10-CM

## 2025-08-20 DIAGNOSIS — H40.041: ICD-10-CM

## 2025-08-20 PROCEDURE — 99213 OFFICE O/P EST LOW 20 MIN: CPT | Performed by: OPHTHALMOLOGY

## 2025-08-20 ASSESSMENT — LID EXAM ASSESSMENTS: OD_TRICHIASIS: RUL

## 2025-08-20 ASSESSMENT — VISUAL ACUITY
OD_BCVA: LP
OS_BCVA: 20/60-

## 2025-08-20 ASSESSMENT — CORNEAL EDEMA - FOLDS/STRIAE: OS_FOLDSSTRIAE: 1+

## 2025-08-27 ENCOUNTER — DOCTOR'S OFFICE (OUTPATIENT)
Facility: LOCATION | Age: 70
Setting detail: OPHTHALMOLOGY
End: 2025-08-27
Payer: MEDICARE

## 2025-08-27 DIAGNOSIS — H40.1132: ICD-10-CM

## 2025-08-27 DIAGNOSIS — H40.041: ICD-10-CM

## 2025-08-27 DIAGNOSIS — H17.9: ICD-10-CM

## 2025-08-27 DIAGNOSIS — H21.01: ICD-10-CM

## 2025-08-27 PROCEDURE — 99213 OFFICE O/P EST LOW 20 MIN: CPT | Performed by: OPHTHALMOLOGY

## 2025-08-27 ASSESSMENT — CORNEAL EDEMA - FOLDS/STRIAE: OS_FOLDSSTRIAE: 1+

## 2025-08-27 ASSESSMENT — VISUAL ACUITY
OS_BCVA: 20/50+
OD_BCVA: LP